# Patient Record
Sex: MALE | Race: BLACK OR AFRICAN AMERICAN | Employment: OTHER | URBAN - METROPOLITAN AREA
[De-identification: names, ages, dates, MRNs, and addresses within clinical notes are randomized per-mention and may not be internally consistent; named-entity substitution may affect disease eponyms.]

---

## 2017-07-12 ENCOUNTER — OFFICE VISIT (OUTPATIENT)
Dept: VASCULAR SURGERY | Age: 67
End: 2017-07-12

## 2017-07-12 VITALS
HEIGHT: 64 IN | HEART RATE: 70 BPM | WEIGHT: 178 LBS | SYSTOLIC BLOOD PRESSURE: 132 MMHG | RESPIRATION RATE: 18 BRPM | BODY MASS INDEX: 30.39 KG/M2 | DIASTOLIC BLOOD PRESSURE: 80 MMHG

## 2017-07-12 DIAGNOSIS — F17.210 CIGARETTE SMOKER: ICD-10-CM

## 2017-07-12 DIAGNOSIS — M79.89 LEG SWELLING: Primary | ICD-10-CM

## 2017-07-13 NOTE — PROGRESS NOTES
[address] [facility]    Chief Complaint   Patient presents with    Swelling       History and Physical    Mr. Lavena Fabry is a 77year old male who presents to our office for evaluation of bilateral leg swelling with new onset burning in his legs. Mr. Lavena Fabry states that he had no previous history of leg swelling, DVTs, congestive heart failure or lymphedema. He states that starting a few months ago he developed tense bilateral lower leg edema. It never caused him pain, but his legs were extremely swollen. Recently, his leg swelling has decreased significantly. This has made him happy but with decrease in edema his leg has become itchy and he gets occasional burning in the skin of his legs in the morning. He denies any pain with walking, any redness or any wounds or ulcers. He is a social smoker. Past Medical History:   Diagnosis Date    Arthritis     Chronic pain     general    Hypertension     Other ill-defined conditions     high cholestrol    Other ill-defined conditions     chronic redness in eyes    Psychiatric disorder     anxiety/depression    Seizures ([facility] [address]      Patient Active Problem List   Diagnosis Code    Screen for colon cancer Z12.11     Past Surgical History:   Procedure Laterality Date    HX GI      colonoscopy x2    HX ORTHOPAEDIC      left knee scope x2     Current Outpatient Prescriptions   Medication Sig Dispense Refill    Amlodipine-Olmesartan 10-20 mg Tab Take 1 Cap by mouth daily.  bisoprolol-hydrochlorothiazide (ZIAC) 5-6.25 mg per tablet Take 1 Tab by mouth daily.  sertraline (ZOLOFT) 50 mg tablet Take 50 mg by mouth daily.  cod liver-vit a&d-petrolatum 96 % Oint by Apply Externally route daily.  fluorometholone (FML) 0.1 % ophthalmic suspension Administer 1 Drop to both eyes every four (4) hours.  hydrocodone-acetaminophen 5-500 mg Cap Take 1 Cap by mouth as needed.       cholecalciferol, vitamin D3, (VITAMIN D3) 2,000 unit Tab Take 1 Cap by mouth daily.      traMADol (ULTRAM) 50 mg tablet Take 50 mg by mouth every six (6) hours as needed.  ezetimibe-simvastatin (VYTORIN 10/40) 10-40 mg per tablet Take 1 Tab by mouth nightly.  omega 3-dha-epa-fish oil (FISH OIL) 100-160-1,000 mg Cap Take 1 Cap by mouth. Allergies   Allergen Reactions    Pcn [Penicillins] Other (comments)     Dizziness, loss of vision     Social History     Social History    Marital status:      Spouse name: N/A    Number of children: N/A    Years of education: N/A     Occupational History    Not on file. Social History Main Topics    Smoking status: Current Some Day Smoker     Years: 30.00    Smokeless tobacco: Never Used      Comment: 2-3 cigs    Alcohol use 1.5 oz/week     1 Glasses of wine, 1 Cans of beer, 1 Shots of liquor per week      Comment: on weekends    Drug use: No    Sexual activity: Not on file     Other Topics Concern    Not on file     Social History Narrative      Family History   Problem Relation Age of Onset    Malignant Hyperthermia Neg Hx     Pseudocholinesterase Deficiency Neg Hx     Delayed Awakening Neg Hx     Emergence Delirium Neg Hx     Post-op Nausea/Vomiting Neg Hx     Post-op Cognitive Dysfunction Neg Hx     Other Neg Hx        Review of Systems    Review of Systems   Constitutional: Negative for chills, diaphoresis, fever, malaise/fatigue and weight loss. HENT: Negative for hearing loss and sore throat. Eyes: Negative for blurred vision, photophobia and redness. Respiratory: Negative for cough, hemoptysis, shortness of breath and wheezing. Cardiovascular: Positive for leg swelling. Negative for chest pain, palpitations and orthopnea. Gastrointestinal: Negative for abdominal pain, blood in stool, constipation, diarrhea, heartburn, nausea and vomiting. Genitourinary: Negative for dysuria, frequency, hematuria and urgency. Musculoskeletal: Negative for back pain and myalgias.    Skin: Negative for itching and rash. Neurological: Negative for dizziness, speech change, focal weakness, weakness and headaches. Endo/Heme/Allergies: Does not bruise/bleed easily. Psychiatric/Behavioral: Negative for depression and suicidal ideas. Physical Exam:    Visit Vitals    /80    Pulse 70    Resp 18    Ht 5' 4\" (1.626 m)    Wt 178 lb (80.7 kg)    BMI 30.55 kg/m2      Physical Examination: General appearance - alert, well appearing, and in no distress  Mental status - alert, oriented to person, place, and time  Eyes - sclera anicteric, left eye normal, right eye normal  Ears - right ear normal, left ear normal  Nose - normal and patent, no erythema, discharge or polyps  Mouth - mucous membranes moist, pharynx normal without lesions  Neck - supple, no significant adenopathy  Lymphatics - no palpable lymphadenopathy  Chest - clear to auscultation, no wheezes, rales or rhonchi, symmetric air entry  Heart - normal rate and regular rhythm  Abdomen - soft, nontender, nondistended, no masses or organomegaly  Extremities - Bilateral lower extremities with 1+ pitting edema. No wounds, no erythema, no prominent varicose veins      Impression and Plan:    ICD-10-CM ICD-9-CM    1. Leg swelling M79.89 729.81    2. Cigarette smoker F17.210 305.1 LOWER EXT ART PVR MULT LEVEL SEG PRESSURES     Orders Placed This Encounter    LOWER EXT ART PVR MULT LEVEL SEG PRESSURES     I told Mr. Kelsey that I am unable to see any of his leg ultrasounds that he had completed earlier. I do not believe his leg swelling is due to venous reflux however. He may have lymphedema, but an acute onset like this unprovoked would be unusual.  I also believe his leg burning in the morning is due to his previous intense edema resolving and that puts stress on the dermal layers. However, given his history of cigarette smoking and somewhat decreased pedal pulses, we will obtain ABIs to further investigate his arterial flow.   We have also given him some tubigrip for temporary compression. We will discuss compression stockings again at his next visit. Follow-up Disposition:  Return in about 4 weeks (around 8/9/2017). The treatment plan was reviewed with the patient in detail. The patient voiced understanding of this plan and all questions and concerns were addressed. The patient agrees with this plan. We discussed the signs and symptoms that would require earlier attention or intervention. The patient was given educational material related to his/her visit and the patient has voiced understanding of the material.     I appreciate the opportunity to participate in the care of your patient. I will be sure to keep you informed of any subsequent changes in the treatment plan. If you have any questions or concerns, please feel free to contact me. Carmen Chaudhry MD    PLEASE NOTE:  This document has been produced using voice recognition software. Unrecognized errors in transcription may be present.

## 2017-08-16 ENCOUNTER — HOSPITAL ENCOUNTER (OUTPATIENT)
Dept: VASCULAR SURGERY | Age: 67
Discharge: HOME | End: 2017-08-16
Attending: SURGERY
Payer: MEDICARE

## 2017-08-16 DIAGNOSIS — F17.210 CIGARETTE SMOKER: ICD-10-CM

## 2017-08-16 PROCEDURE — 93923 UPR/LXTR ART STDY 3+ LVLS: CPT

## 2017-08-16 NOTE — PROCEDURES
[facility]  *** FINAL REPORT ***    Name: Reid Melendrez  MRN: [id number]    Outpatient  : 05 Aug 1950  HIS Order #: [id number]  [address] [facility] Visit #: [id number]  Date: 16 Aug 2017    TYPE OF TEST: Peripheral Arterial Testing    REASON FOR TEST  Claudication    Right Leg  Segmentals: Normal                     mmHg  Brachial         137  High thigh  Low thigh  Calf  Posterior tibial 163  Dorsalis pedis   146  Peroneal  Metatarsal  Toe pressure     116  Doppler:    Normal  Ankle/Brachial: 1.19    Left Leg  Segmentals: Normal                     mmHg  Brachial         135  High thigh  Low thigh  Calf  Posterior tibial 157  Dorsalis pedis   159  Peroneal  Metatarsal  Toe pressure      94  Doppler:    Normal  Ankle/Brachial: 1.16    INTERPRETATION/FINDINGS  Physiologic testing was performed using continuous wave Doppler and  segmental pressures. 1. No evidence of significant peripheral arterial disease at rest in  the right leg. 2. No evidence of significant peripheral arterial disease at rest in  the left leg. 3. The right ankle/brachial index is 1.19 and the toe/brachial index  is 0.85  4. The left ankle/brachial index is 1.16 and the toe/brachial index is   0.69    ADDITIONAL COMMENTS    I have personally reviewed the data relevant to the interpretation of  this  study.     TECHNOLOGIST: Elaina Current  Signed: 2017 12:17 PM    PHYSICIAN: Diane You MD  Signed: 2017 04:02 PM

## 2017-08-23 ENCOUNTER — OFFICE VISIT (OUTPATIENT)
Dept: VASCULAR SURGERY | Age: 67
End: 2017-08-23

## 2017-08-23 VITALS
HEART RATE: 72 BPM | WEIGHT: 178 LBS | HEIGHT: 64 IN | DIASTOLIC BLOOD PRESSURE: 76 MMHG | SYSTOLIC BLOOD PRESSURE: 122 MMHG | BODY MASS INDEX: 30.39 KG/M2 | RESPIRATION RATE: 20 BRPM

## 2017-08-23 DIAGNOSIS — M79.89 LEG SWELLING: Primary | ICD-10-CM

## 2017-08-23 NOTE — MR AVS SNAPSHOT
Visit Information Date & Time Provider Department Dept. Phone Encounter #  
 8/23/2017  9:45 AM Silvia Celeste MD [facility] [address] [id number] Your Appointments 2/6/2018  9:15 AM  
Follow Up with Silvia Celeste MD  
[facility] Appt Note: 6 month FU no studies [facility] [address] [place] [address]  
  
   
 [facility] Shefali Ayala Upcoming Health Maintenance Date Due Hepatitis C Screening 1950 DTaP/Tdap/Td series (1 - Tdap) 8/5/1971 FOBT Q 1 YEAR AGE 50-75 8/5/2000 ZOSTER VACCINE AGE 60> 6/5/2010 GLAUCOMA SCREENING Q2Y 8/5/2015 Pneumococcal 65+ Low/Medium Risk (1 of 2 - PCV13) 8/5/2015 MEDICARE YEARLY EXAM 8/5/2015 INFLUENZA AGE 9 TO ADULT 8/1/2017 Allergies as of 8/23/2017  Review Complete On: 8/23/2017 By: Fara Thornton RN Severity Noted Reaction Type Reactions Pcn [Penicillins] High 10/08/2012    Other (comments) Dizziness, loss of vision Current Immunizations  Never Reviewed No immunizations on file. Not reviewed this visit Vitals BP Pulse Resp Height(growth percentile) Weight(growth percentile) BMI  
 122/76 72 20 5' 4\" (1.626 m) 178 lb (80.7 kg) 30.55 kg/m2 Smoking Status Current Some Day Smoker Vitals History BMI and BSA Data Body Mass Index Body Surface Area 30.55 kg/m 2 1.91 m 2 Your Updated Medication List  
  
   
This list is accurate as of: 8/23/17 10:10 AM.  Always use your most recent med list. amLODIPine-Olmesartan 10-20 mg Tab Take 1 Cap by mouth daily. bisoprolol-hydroCHLOROthiazide 5-6.25 mg per tablet Commonly known as:  [facility] Take 1 Tab by mouth daily. cod liver-vit a&d-petrolatum 96 % Oint  
by Apply Externally route daily. FISH -160-1,000 mg Cap Generic drug:  omega 3-dha-epa-fish oil Take 1 Cap by mouth. fluorometholone 0.1 % ophthalmic suspension Commonly known as: [address] [facility] Administer 1 Drop to both eyes every four (4) hours. HYDROcodone-acetaminophen 5-500 mg Cap Take 1 Cap by mouth as needed. sertraline 50 mg tablet Commonly known as:  ZOLOFT Take 50 mg by mouth daily. traMADol 50 mg tablet Commonly known as:  ULTRAM  
Take 50 mg by mouth every six (6) hours as needed. VITAMIN D3 2,000 unit Tab Generic drug:  cholecalciferol (vitamin D3) Take 1 Cap by mouth daily. Vytorin 10/40 10-40 mg per tablet Generic drug:  ezetimibe-simvastatin Take 1 Tab by mouth nightly. Introducing [facility]! Mikhail Mosquera introduces [employer]pt[employer] patient portal. Now you can access parts of your medical record, email your doctor's office, and request medication refills online. 1. In your internet browser, go to [email or web address] 2. Click on the First Time User? Click Here link in the Sign In box. You will see the New Member Sign Up page. 3. Enter your ComptTIA Access Code exactly as it appears below. You will not need to use this code after youve completed the sign-up process. If you do not sign up before the expiration date, you must request a new code. · Compt[employer] Access Code: [email or web address] Expires: 10/10/2017  9:44 AM 
 
4. Enter the last four digits of your Social Security Number (xxxx) and Date of Birth (mm/dd/yyyy) as indicated and click Submit. You will be taken to the next sign-up page. 5. Create a ComptTIA ID. This will be your ComptTIA login ID and cannot be changed, so think of one that is secure and easy to remember. 6. Create a ComptTIA password. You can change your password at any time. 7. Enter your Password Reset Question and Answer. This can be used at a later time if you forget your password. 8. Enter your e-mail address. You will receive e-mail notification when new information is available in [address] 9. Click Sign Up. You can now view and download portions of your medical record. 10. Click the Download Summary menu link to download a portable copy of your medical information. If you have questions, please visit the Frequently Asked Questions section of the DLS website. Remember, DLS is NOT to be used for urgent needs. For medical emergencies, dial 911. Now available from your iPhone and Android! Please provide this summary of care documentation to your next provider. Your primary care clinician is listed as Luzma Lange III. If you have any questions after today's visit, please call [phone].

## 2017-08-25 NOTE — PROGRESS NOTES
[address] [facility]    Chief Complaint   Patient presents with    Swelling       History and Physical    Mr. January Drake returns to our office for continued management of his bilateral leg swelling. He continues to state that his legs have returned to normal size. He states that the burning he complained of at his last appointment has resolved. He has no additional complaints. Past Medical History:   Diagnosis Date    Arthritis     Chronic pain     general    Hypertension     Other ill-defined conditions     high cholestrol    Other ill-defined conditions     chronic redness in eyes    Psychiatric disorder     anxiety/depression    Seizures ([facility] [address]      Patient Active Problem List   Diagnosis Code    Screen for colon cancer Z12.11     Past Surgical History:   Procedure Laterality Date    HX GI      colonoscopy x2    HX ORTHOPAEDIC      left knee scope x2     Current Outpatient Prescriptions   Medication Sig Dispense Refill    Amlodipine-Olmesartan 10-20 mg Tab Take 1 Cap by mouth daily.  bisoprolol-hydrochlorothiazide (ZIAC) 5-6.25 mg per tablet Take 1 Tab by mouth daily.  sertraline (ZOLOFT) 50 mg tablet Take 50 mg by mouth daily.  cod liver-vit a&d-petrolatum 96 % Oint by Apply Externally route daily.  omega 3-dha-epa-fish oil (FISH OIL) 100-160-1,000 mg Cap Take 1 Cap by mouth.  fluorometholone (FML) 0.1 % ophthalmic suspension Administer 1 Drop to both eyes every four (4) hours.  hydrocodone-acetaminophen 5-500 mg Cap Take 1 Cap by mouth as needed.  cholecalciferol, vitamin D3, (VITAMIN D3) 2,000 unit Tab Take 1 Cap by mouth daily.  traMADol (ULTRAM) 50 mg tablet Take 50 mg by mouth every six (6) hours as needed.  ezetimibe-simvastatin (VYTORIN 10/40) 10-40 mg per tablet Take 1 Tab by mouth nightly.          Allergies   Allergen Reactions    Pcn [Penicillins] Other (comments)     Dizziness, loss of vision     Social History     Social History    Marital status:      Spouse name: N/A    Number of children: N/A    Years of education: N/A     Occupational History    Not on file. Social History Main Topics    Smoking status: Current Some Day Smoker     Years: 30.00    Smokeless tobacco: Never Used      Comment: 2-3 cigs    Alcohol use 1.5 oz/week     1 Glasses of wine, 1 Cans of beer, 1 Shots of liquor per week      Comment: on weekends    Drug use: No    Sexual activity: Not on file     Other Topics Concern    Not on file     Social History Narrative      Family History   Problem Relation Age of Onset    Malignant Hyperthermia Neg Hx     Pseudocholinesterase Deficiency Neg Hx     Delayed Awakening Neg Hx     Emergence Delirium Neg Hx     Post-op Nausea/Vomiting Neg Hx     Post-op Cognitive Dysfunction Neg Hx     Other Neg Hx        Review of Systems    Review of Systems   Constitutional: Negative for chills, diaphoresis, fever, malaise/fatigue and weight loss. HENT: Negative for hearing loss and sore throat. Eyes: Negative for blurred vision, photophobia and redness. Respiratory: Negative for cough, hemoptysis, shortness of breath and wheezing. Cardiovascular: Negative for chest pain, palpitations and orthopnea. Gastrointestinal: Negative for abdominal pain, blood in stool, constipation, diarrhea, heartburn, nausea and vomiting. Genitourinary: Negative for dysuria, frequency, hematuria and urgency. Musculoskeletal: Negative for back pain and myalgias. Skin: Negative for itching and rash. Neurological: Negative for dizziness, speech change, focal weakness, weakness and headaches. Endo/Heme/Allergies: Does not bruise/bleed easily. Psychiatric/Behavioral: Negative for depression and suicidal ideas.             Physical Exam:    Visit Vitals    /76    Pulse 72    Resp 20    Ht 5' 4\" (1.626 m)    Wt 178 lb (80.7 kg)    BMI 30.55 kg/m2      Physical Examination: General appearance - alert, well appearing, and in no distress  Mental status - alert, oriented to person, place, and time  Eyes - sclera anicteric, left eye normal, right eye normal  Ears - right ear normal, left ear normal  Nose - normal and patent, no erythema, discharge or polyps  Mouth - mucous membranes moist, pharynx normal without lesions  Extremities - 1+ pitting edema on the left. Trace edema on the right. No wounds or ulcerations. Impression and Plan:    ICD-10-CM ICD-9-CM    1. Leg swelling M79.89 729.81      I reviewed Mr. Kelsey's MIKA studies with him. This demonstrated normal arterial flow. I have again recommended compression stockings for his legs, but as his swelling has resolved, I do not believe he will wear them. We will check him again in 3 months to see how his legs are doing and to determine if continued follow up is needed. The treatment plan was reviewed with the patient in detail. The patient voiced understanding of this plan and all questions and concerns were addressed. The patient agrees with this plan. We discussed the signs and symptoms that would require earlier attention or intervention. The patient was given educational material related to his/her visit and the patient has voiced understanding of the material.     I appreciate the opportunity to participate in the care of your patient. I will be sure to keep you informed of any subsequent changes in the treatment plan. If you have any questions or concerns, please feel free to contact me. Norris Lopes MD    PLEASE NOTE:  This document has been produced using voice recognition software. Unrecognized errors in transcription may be present.

## 2018-10-31 ENCOUNTER — HOSPITAL ENCOUNTER (OUTPATIENT)
Dept: LAB | Age: 68
Discharge: HOME | End: 2018-10-31
Payer: MEDICARE

## 2018-10-31 ENCOUNTER — HOSPITAL ENCOUNTER (OUTPATIENT)
Dept: NON INVASIVE DIAGNOSTICS | Age: 68
Discharge: HOME | End: 2018-10-31
Payer: MEDICARE

## 2018-10-31 DIAGNOSIS — M75.101 ROTATOR CUFF SYNDROME OF RIGHT SHOULDER: ICD-10-CM

## 2018-10-31 DIAGNOSIS — M25.511 RIGHT SHOULDER PAIN: ICD-10-CM

## 2018-10-31 LAB
HCT VFR BLD AUTO: 44.8 % (ref 36–48)
HGB BLD-MCNC: 14.5 G/DL (ref 13–16)
POTASSIUM SERPL-SCNC: 4.5 MMOL/L (ref 3.5–5.5)

## 2018-10-31 PROCEDURE — 85014 HEMATOCRIT: CPT | Performed by: ORTHOPAEDIC SURGERY

## 2018-10-31 PROCEDURE — 36415 COLL VENOUS BLD VENIPUNCTURE: CPT | Performed by: ORTHOPAEDIC SURGERY

## 2018-10-31 PROCEDURE — 84132 ASSAY OF SERUM POTASSIUM: CPT | Performed by: ORTHOPAEDIC SURGERY

## 2018-10-31 PROCEDURE — 85018 HEMOGLOBIN: CPT | Performed by: ORTHOPAEDIC SURGERY

## 2018-10-31 PROCEDURE — 93005 ELECTROCARDIOGRAM TRACING: CPT

## 2018-11-01 LAB
ATRIAL RATE: 56 BPM
CALCULATED P AXIS, ECG09: 70 DEGREES
CALCULATED R AXIS, ECG10: 49 DEGREES
CALCULATED T AXIS, ECG11: 15 DEGREES
DIAGNOSIS, 93000: NORMAL
FAX TO INFO,FAXT: NORMAL
FAX TO NUMBER,FAXN: NORMAL
P-R INTERVAL, ECG05: 204 MS
Q-T INTERVAL, ECG07: 422 MS
QRS DURATION, ECG06: 84 MS
QTC CALCULATION (BEZET), ECG08: 407 MS
VENTRICULAR RATE, ECG03: 56 BPM

## 2019-04-16 ENCOUNTER — HOSPITAL ENCOUNTER (OUTPATIENT)
Dept: PHYSICAL THERAPY | Age: 69
Discharge: HOME | End: 2019-04-16
Payer: MEDICARE

## 2019-04-16 PROCEDURE — 97110 THERAPEUTIC EXERCISES: CPT

## 2019-04-16 PROCEDURE — 97530 THERAPEUTIC ACTIVITIES: CPT

## 2019-04-16 PROCEDURE — 97162 PT EVAL MOD COMPLEX 30 MIN: CPT

## 2019-04-16 NOTE — PROGRESS NOTES
[facility] at the [address], [place], [address] [facility]  Phone: [phone]      Fax:  [phone]       Plan of Care/ Statement of Necessity for Physical Therapy Services      Patient name: Bro Peraza Start of Care: 2019   Referral source: Kain Torres MD : 1950    Medical Diagnosis: Hip pain, right [M25.551]   Onset Date:~3 weeks ago    Treatment Diagnosis: Right Hip pain    Prior Hospitalization: see medical history Provider#: [id number]   Medications: Verified on Patient summary List    Comorbidities: HTN, BMI >30, previous surgeries   Prior Level of Function:  pain free with all daily, recreational activities       The Plan of Care and following information is based on the information from the initial evaluation. Assessment/ key information:   Pt is a 76 y.o. Male with C/C of right anterior hip pain with pain into right groin. Pt reports onset ~3 weeks ago with bending forward in the yard. Pt reports that pain is intermittent and primarily exacerbated with flexion and reaching to the right. Signs/Symptoms consistent with hip OA. Objective findings include increased right lateralization, decreased ROM, decreased strength, glut and hamstring inhibition and gait/balance deficits. Evaluation Complexity History HIGH Complexity :3+ comorbidities / personal factors will impact the outcome/ POC ; Examination HIGH Complexity : 4+ Standardized tests and measures addressing body structure, function, activity limitation and / or participation in recreation  ;Presentation MEDIUM Complexity : Evolving with changing characteristics  ; Clinical Decision Making MEDIUM Complexity : FOTO score of 26-74  Overall Complexity Rating: MEDIUM  Problem List: pain affecting function, decrease ROM, decrease strength, impaired gait/ balance, decrease ADL/ functional abilitiies, decrease activity tolerance and decrease flexibility/ joint mobility   Treatment Plan may include any combination of the following: Therapeutic exercise, Therapeutic activities, Neuromuscular re-education, Physical agent/modality, Gait/balance training, Manual therapy and Patient education  Patient / Family readiness to learn indicated by: asking questions, trying to perform skills and interest  Persons(s) to be included in education: patient (P)  Barriers to Learning/Limitations: None  Patient Goal (s): Make sure I do what ever is needed to prevent the pain from coming back. I want to get well.   Patient Self Reported Health Status: good  Rehabilitation Potential: good    Short Term Goals: STG- To be accomplished in 4  treatment(s):  1. Pt will be independent with HEP to encourage prophylaxis. Eval:dispensed - to be updated  Current: same as eval     Long Term Goals: LTG- To be accomplished in 12 visit(s):  1. Pt will improve standing forward flexion to dorsum of foot without pain to increase tolerance to daily activities and donning/doffing shoes. Eval:9 in from floor, no reversal of lordosis, decreased use of gluts to stand, reaches to the right   Current: same as eval     2. Pt will be able to perform >10 bridges to full height to indicate functional glut and hamstring strength. Eval:1 bridge, decreased height   Current: same as eval     3. Pt will imporve bilateral HIP [facility] and trunk rot to 15 in or less to allow pt to return to PLOF. Eval:  AROM/PROM Right Left   Hip Flex 96 100   Hip IR 16 19   Hip ER 24 20   Trunk Rot 17 in 17 in                      Current: same as eval     4. Pt FOTO score will increase by >12 points to show improvement in function. Eval:40  Current: will address at visit 5      Frequency / Duration: Patient to be seen 2 times per week for 6 weeks. /12 visits    Patient/ Caregiver education and instruction: Diagnosis, prognosis, self care, activity modification and exercises   [x]  Plan of care has been reviewed with PTA    Certification Period: 4/16/19 - 6/15/19  Sergio Monae, PT, DPT 4/16/2019 2:47 PM  _____________________________________________________________________  I certify that the above Therapy Services are being furnished while the patient is under my care. I agree with the treatment plan and certify that this therapy is necessary.     Physician's Signature:____________Date:_________TIME:________    [employer], Date and Time must be completed for valid certification **    Please sign and return to [facility] at the [address], [facility], [address] [facility]       Phone: [phone]      Fax:  [phone]

## 2019-04-16 NOTE — PROGRESS NOTES
PT DAILY TREATMENT NOTE    Patient Name: Cee Foote  Date:2019  : 1950  [x]  Patient  Verified  Payor: Krystle Mustafa / Plan: [place] / Product Type: Medicare /    In time:1:18 pm  Out time:2:02 pm   Total Treatment Time (min): 44  Total Timed Codes (min): 23  1:1 Treatment Time ( [address] only): 42   Visit #: 1 of 12    Treatment Area: Hip pain, right [M25.551]    SUBJECTIVE  Pain Level (0-10 scale): 0  Any medication changes, allergies to medications, adverse drug reactions, diagnosis change, or new procedure performed?: [x] No    [] Yes (see summary sheet for update)  Subjective functional status/changes:   [] No changes reported    Hx Present Illness: C/C of right hip pain  \"I was bending over and I got a sharp pain in what I thought was my groin. I would call the pain a cramp. \"  Onset ~3 weeks ago  Saw MD ~2 weeks ago  X-ray by MD - per pt showed arthritis  Reports bending down in backyard - reports pain has been intermittent since initial episode  Increase in pain with bending, especially bending to the right, bending over, putting on shoes(right)  Denies pain with walking, stairs  PLOF: pain free with all daily, recreational activities   Pt reports in last6-12 months frequent cramping in LE especially left gastroc      Pain:  _10__/10 max       __0_/10 min     _0___/10 now    Location: indicates anterior right hip and groin     [x] Sharp    [] Dull      [] Burning     []  Aching     [] Throbbing      [] Tingling     [x] Other: \"sharp and hurting\"      []  Constant                   [x] Intermittent        Previous treatment:   None as per pt     PMHX: PMHx/Surgical Hx:  Right RCR - 2018, Left knee surgery many years ago     Social/Recreation/Work: Work Hx: [employer], [place] [employer]   Living Situation: lives with wife, no stairs inside home, 3 steps to enter home   Recreational Activities: riding motorcycle, yard work     Patient Goal(s): \"Make sure I do what ever is needed to prevent the pain from coming back. I want to get well. \"    Cognition: A & O x 4      OBJECTIVE    Modalities Rationale:       min [] Estim, type/location:                                      []  att     []  unatt     []  w/US     []  w/ice    []  w/heat    min []  Mechanical Traction: type/lbs                   []  pro   []  sup   []  int   []  cont    []  before manual    []  after manual    min []  Ultrasound, settings/location:      min []  Iontophoresis w/ dexamethasone, location:                                               []  take home patch       []  in clinic    min []  Ice     []  Heat    location/position:     min []  Vasopneumatic Device, press/temp:     min []  Other:    [] Skin assessment post-treatment (if applicable):    []  intact    []  redness- no adverse reaction     []redness - adverse reaction:        22 min [x]Eval                  []Re-Eval       10 min Therapeutic Exercise:  [x] See flow sheet :   Rationale: increase ROM, increase strength, improve coordination and increase proprioception to improve the patients ability to perform daily activities with decreased pain and symptom levels          With   [] TE   [x] TA - 13 min   [] neuro   [] other: Patient Education: [x] Review HEP    [] Progressed/Changed HEP based on:   [] positioning   [] body mechanics   [] transfers   [] heat/ice application    [x] other: pt education regarding exam findings, anatomy involved, POC     Other Objective/Functional Measures:    Movement/gait:  Decreased arm swing bilaterally    Visual Inspection: Thoracic: flattened  Lumbar: increased  Shoulder/Scapula: adducted-WNL    Palpation: no TTP but increased tone in bilateral hip flexors posterior hip and along paraspinals                            AROM/PROM Right Left   Hip Flex 96 100   Hip IR 16 19   Hip ER 24 20                    Strength Right Left   Hip Flex 4 4   Knee Ext 5 5   Hamstrings 4 4**                  Noted significant compensations with trunk during MMT  Bridging: no pain, decreased height   Glut inhibition: bilaterally   cramping in left gastroc with MMT of hamstrings     Special Tests Right Left   Hip Scour + -   Trunk Rot (hooklying) 17 in 17 in    Passive SLR 90 68   Hip add drop + +,unable to get to past                Other Right Left                                       Other Comments: Standing Forward Flexion 9 in from floor, no reversal of lordosis, decreased use of gluts to stand, reaches to the right   Gillet: hypomobile and increased lateralization bilaterally       Pain Level (0-10 scale) post treatment: 0    ASSESSMENT/Changes in Function:   Pt is a 76 y.o. Male with C/C of right anterior hip pain with pain into right groin. Pt reports onset ~3 weeks ago with bending forward in the yard. Pt reports that pain is intermittent and primarily exacerbated with flexion and reaching to the right. Signs/Symptoms consistent with hip OA. Objective findings include increased right lateralization, decreased ROM, decreased strength, glut and hamstring inhibition and gait/balance deficits. Patient will continue to benefit from skilled PT services to modify and progress therapeutic interventions, address functional mobility deficits, address ROM deficits, address strength deficits, analyze and address soft tissue restrictions, analyze and cue movement patterns, analyze and modify body mechanics/ergonomics, assess and modify postural abnormalities and instruct in home and community integration to attain remaining goals. [x]  See Plan of Care  []  See progress note/recertification  []  See Discharge Summary         Progress towards goals / Updated goals:  Short Term Goals: STG- To be accomplished in 4  treatment(s):  1. Pt will be independent with HEP to encourage prophylaxis. Eval:dispensed - to be updated  Current: same as eval    Long Term Goals: LTG- To be accomplished in 12 visit(s):  1.   Pt will improve standing forward flexion to dorsum of foot without pain to increase tolerance to daily activities and donning/doffing shoes. Eval:9 in from floor, no reversal of lordosis, decreased use of gluts to stand, reaches to the right   Current: same as eval    2. Pt will be able to perform >10 bridges to full height to indicate functional glut and hamstring strength. Eval:1 bridge, decreased height   Current: same as eval    3. Pt will imporve bilateral HIP AROM to [facility] and trunk rot to 15 in or less to allow pt to return to PLOF. Eval:  AROM/PROM Right Left   Hip Flex 96 100   Hip IR 16 19   Hip ER 24 20   Trunk Rot 17 in 17 in               Current: same as eval    4. Pt FOTO score will increase by >12 points to show improvement in function. Eval:40  Current: will address at visit 5      PLAN  [x]  Upgrade activities as tolerated     []  Continue plan of care  []  Update interventions per flow sheet       []  Discharge due to:_  []  Other:_      Margy Leonard, PT, DPT 4/16/2019  1:18 PM    No future appointments.

## 2019-04-19 ENCOUNTER — HOSPITAL ENCOUNTER (OUTPATIENT)
Dept: PHYSICAL THERAPY | Age: 69
Discharge: HOME | End: 2019-04-19
Payer: MEDICARE

## 2019-04-19 PROCEDURE — 97110 THERAPEUTIC EXERCISES: CPT

## 2019-04-19 NOTE — PROGRESS NOTES
PT DAILY TREATMENT NOTE    Patient Name: Angela Cho  Date:2019  : 1950  [x]  Patient  Verified  Payor: Crystal Janell / Plan: [place] MEDICARE PART A & B / Product Type: Medicare /    In time:7:45  Out time:8:25  Total Treatment Time (min): 40  Total Timed Codes (min): 40  1:1 Treatment Time ( [address] only): 40   Visit #: 2 of 12    Treatment Area: Hip pain, right [M25.551]    SUBJECTIVE  Pain Level (0-10 scale): 0  Any medication changes, allergies to medications, adverse drug reactions, diagnosis change, or new procedure performed?: [x] No    [] Yes (see summary sheet for update)  Subjective functional status/changes:   [] No changes reported  \"good , just stiff. \"     OBJECTIVE    Modalities Rationale:     decrease pain to improve patient's ability to perform pain free ADL's    min [] Estim, type/location:                                      []  att     []  unatt     []  w/US     []  w/ice    []  w/heat    min []  Mechanical Traction: type/lbs                   []  pro   []  sup   []  int   []  cont    []  before manual    []  after manual    min []  Ultrasound, settings/location:      min []  Iontophoresis w/ dexamethasone, location:                                               []  take home patch       []  in clinic    min []  Ice     []  Heat    location/position:     min []  Vasopneumatic Device, press/temp:     min []  Other:    [] Skin assessment post-treatment (if applicable):    []  intact    []  redness- no adverse reaction     []redness - adverse reaction:          40 min Therapeutic Exercise:  [] See flow sheet :   Rationale: increase ROM, increase strength, improve coordination and improve balance to improve the patients ability to perform pain free ADl's     With   [] TE   [] TA   [] neuro   [] other: Patient Education: [x] Review HEP    [] Progressed/Changed HEP based on:   [] positioning   [] body mechanics   [] transfers   [] heat/ice application    [] other:      Other Objective/Functional Measures:   Challenged with PPT      Pain Level (0-10 scale) post treatment: 0    ASSESSMENT/Changes in Function: Pt reported soreness , however no significant pain. challenged with understanding and performing PPT. C/o left gastroc cramp with Pelvic tilting. Patient will continue to benefit from skilled PT services to modify and progress therapeutic interventions, address functional mobility deficits, address ROM deficits, address strength deficits, analyze and address soft tissue restrictions, analyze and cue movement patterns, analyze and modify body mechanics/ergonomics, assess and modify postural abnormalities, address imbalance/dizziness and instruct in home and community integration to attain remaining goals. []  See Plan of Care  []  See progress note/recertification  []  See Discharge Summary         Progress towards goals / Updated goals:  Short Term Goals: STG- To be accomplished in 4  treatment(s):  1. Pt will be independent with HEP to encourage prophylaxis. Eval:dispensed - to be updated  Current: reveiwed      Long Term Goals: LTG- To be accomplished in 12 visit(s):  1. Pt will improve standing forward flexion to dorsum of foot without pain to increase tolerance to daily activities and donning/doffing shoes. Eval:9 in from floor, no reversal of lordosis, decreased use of gluts to stand, reaches to the right   Current: same as eval     2. Pt will be able to perform >10 bridges to full height to indicate functional glut and hamstring strength. Eval:1 bridge, decreased height   Current: same as eval     3. Pt [facility] and [facility] rot to 15 in or less to allow pt to return to PLOF. Eval:  AROM/PROM Right Left   Hip Flex 96 100   Hip IR 16 19   Hip ER 24 20   Trunk Rot 17 in 17 in                      Current: same as eval     4. Pt FOTO score will increase by >12 points to show improvement in function.   Eval:40  Current: will address at visit 5                PLAN  [x]  Upgrade activities as tolerated     [x]  Continue plan of care  []  Update interventions per flow sheet       []  Discharge due to:_  []  Other:_      Kurt Connor PTA 4/19/2019  7:41 AM    Future Appointments   Date Time Provider Estevan Raphael   4/19/2019  7:45 AM RICKY Aguirre [facility]   4/22/2019  8:45 AM Manas Sierra PT, DPT [facility]   4/26/2019  1:00 PM Manas Sierra PT, DPT [facility]   4/30/2019  8:30 AM Tawanna Kimble [facility]   5/2/2019 12:30 PM RICKY [facility]ir[facility]

## 2019-04-22 ENCOUNTER — HOSPITAL ENCOUNTER (OUTPATIENT)
Dept: PHYSICAL THERAPY | Age: 69
Discharge: HOME | End: 2019-04-22
Payer: MEDICARE

## 2019-04-22 PROCEDURE — 97110 THERAPEUTIC EXERCISES: CPT

## 2019-04-22 NOTE — PROGRESS NOTES
PT DAILY TREATMENT NOTE    Patient Name: Adolfo Canseco  Date:2019  : 1950  [x]  Patient  Verified  Payor: Michael Wilson / Plan: [place] MEDICARE PART A & B / Product Type: Medicare /    In time:8:43 am  Out time:9:39 am  Total Treatment Time (min): 56  Total Timed Codes (min): 56  1:1 Treatment Time ( [address] only): 39   Visit #: 3 of 12    Treatment Area: Hip pain, right [M25.551]    SUBJECTIVE  Pain Level (0-10 scale): 0  Any medication changes, allergies to medications, adverse drug reactions, diagnosis change, or new procedure performed?: [x] No    [] Yes (see summary sheet for update)  Subjective functional status/changes:   [] No changes reported  \"Good, no pain since I started here. \"    OBJECTIVE    Modalities Rationale:     min [] Estim, type/location:                                      []  att     []  unatt     []  w/US     []  w/ice    []  w/heat    min []  Mechanical Traction: type/lbs                   []  pro   []  sup   []  int   []  cont    []  before manual    []  after manual    min []  Ultrasound, settings/location:      min []  Iontophoresis w/ dexamethasone, location:                                               []  take home patch       []  in clinic    min []  Ice     []  Heat    location/position:     min []  Vasopneumatic Device, press/temp:     min []  Other:    [] Skin assessment post-treatment (if applicable):    []  intact    []  redness- no adverse reaction     []redness - adverse reaction:          56 min Therapeutic Exercise:  [x] See flow sheet :   Rationale: increase ROM, increase strength, improve coordination and increase proprioception to improve the patients ability to perform daily activities with decreased pain and symptom levels            With   [] TE   [] TA   [] neuro   [] other: Patient Education: [x] Review HEP    [] Progressed/Changed HEP based on:   [] positioning   [] body mechanics   [] transfers   [] heat/ice application    [] other:      Other Objective/Functional Measures:   Standing Forward flexion: 8 in, no lumbar lordosis mild pinching. Able to decrease pinching with PPT before forward flexion      Pain Level (0-10 scale) post treatment: 0    ASSESSMENT/Changes in Function:   Pt education regarding anatomy and biomechanics - discussed with pt has pinching not just because of Hip OA but alos because of anterior pelvic tilt and poor lumbo-pelvic mechanics. Challenged with clamshells in S/L and with cat camel. Patient will continue to benefit from skilled PT services to modify and progress therapeutic interventions, address functional mobility deficits, address ROM deficits, address strength deficits, analyze and address soft tissue restrictions, analyze and cue movement patterns, analyze and modify body mechanics/ergonomics, assess and modify postural abnormalities and instruct in home and community integration to attain remaining goals. []  See Plan of Care  []  See progress note/recertification  []  See Discharge Summary         Progress towards goals / Updated goals:  Short Term Goals: STG- To be accomplished in 4  treatment(s):  1.  Pt will be independent with HEP to encourage prophylaxis. Eval:dispensed - to be updated  Current: progressing/met - reveiwed, reports compliance      Long Term Goals: LTG- To be accomplished in 12 visit(s):  1.  Pt will improve standing forward flexion to dorsum of foot without pain to increase tolerance to daily activities and donning/doffing shoes. Eval:9 in from floor, no reversal of lordosis, decreased use of gluts to stand, reaches to the right   Current: mild progress: 8 in, no lumbar lordosis mild pinching. Able to decrease pinching with PPT before forward flexion      2.  Pt will be able to perform >10 bridges to full height to indicate functional glut and hamstring strength.   Eval:1 bridge, decreased height   Current: continued decreased height     3.  Pt will imporve bilateral HIP AROM to [facility] and trunk rot to 15 in or less to allow pt to return to PLOF. Eval:  AROM/PROM Right Left   Hip Flex 96 100   Hip IR 16 19   Hip ER 24 20   Trunk Rot 17 in 17 in                      Current: same as eval     4.  Pt FOTO score will increase by >12 points to show improvement in function.   Eval:40  Current: will address at visit 5    PLAN  [x]  Upgrade activities as tolerated     []  Continue plan of care  []  Update interventions per flow sheet       []  Discharge due to:_  []  Other:_      Teddy Schneider, PT, DPT 4/22/2019  8:53 AM    Future Appointments   Date Time Provider Estevan Raphael   4/26/2019  1:00 PM Daxa Pisano, PT, DPT [facility]   4/30/2019  8:30 AM Kaden Kimble [facility]   5/2/2019 12:30 PM Robert Gaines PTA [facility]

## 2019-04-30 ENCOUNTER — HOSPITAL ENCOUNTER (OUTPATIENT)
Dept: PHYSICAL THERAPY | Age: 69
End: 2019-04-30
Payer: MEDICARE

## 2019-05-02 ENCOUNTER — APPOINTMENT (OUTPATIENT)
Dept: PHYSICAL THERAPY | Age: 69
End: 2019-05-02
Payer: MEDICARE

## 2019-05-09 ENCOUNTER — HOSPITAL ENCOUNTER (OUTPATIENT)
Dept: PHYSICAL THERAPY | Age: 69
Discharge: HOME | End: 2019-05-09
Payer: MEDICARE

## 2019-05-09 PROCEDURE — 97110 THERAPEUTIC EXERCISES: CPT

## 2019-05-14 ENCOUNTER — HOSPITAL ENCOUNTER (OUTPATIENT)
Dept: PHYSICAL THERAPY | Age: 69
Discharge: HOME | End: 2019-05-14
Payer: MEDICARE

## 2019-05-14 PROCEDURE — 97110 THERAPEUTIC EXERCISES: CPT

## 2019-05-14 NOTE — PROGRESS NOTES
PT DAILY TREATMENT NOTE    Patient Name: Estefany Rasmussen  Date:2019  : 1950  [x]  Patient  Verified  Payor: Meena Corea / Plan: [place] MEDICARE PART A & B / Product Type: Medicare /    In time:9:14  Out time:10:20  Total Treatment Time (min): 66  Total Timed Codes (min): 66  1:1 Treatment Time ( [address] only): 55   Visit #: 5 of 12    Treatment Area: Hip pain, right [M25.551]    SUBJECTIVE  Pain Level (0-10 scale): 0  Any medication changes, allergies to medications, adverse drug reactions, diagnosis change, or new procedure performed?: [x] No    [] Yes (see summary sheet for update)  Subjective functional status/changes:   [] No changes reported  \"Just tender, not sore. \"    OBJECTIVE      66 min Therapeutic Exercise:  [x] See flow sheet :   Rationale: increase ROM and increase strength to improve the patients ability to perform daily activities with decreased pain and symptom levels       With   [] TE   [] TA   [] neuro   [] other: Patient Education: [x] Review HEP    [] Progressed/Changed HEP based on:   [] positioning   [] body mechanics   [] transfers   [] heat/ice application    [] other:      Other Objective/Functional Measures:   FOTO 65     Pain Level (0-10 scale) post treatment: 0    ASSESSMENT/Changes in Function: Good tolerance to exercises with challenge with clams and bridges today due to fatigue. Reporting overall pain decreasing in hip just soreness now. Decreased balance with elton step overs with cues to decrease hip abduction. Patient will continue to benefit from skilled PT services to modify and progress therapeutic interventions, address functional mobility deficits, address ROM deficits, address strength deficits, analyze and cue movement patterns, analyze and modify body mechanics/ergonomics, assess and modify postural abnormalities, address imbalance/dizziness and instruct in home and community integration to attain remaining goals.      []  See Plan of Care  []  See progress note/recertification  []  See Discharge Summary         Progress towards goals / Updated goals:  Short Term Goals: STG- To be accomplished in 4  treatment(s):  1.  Pt will be independent with HEP to encourage prophylaxis. Eval:dispensed - to be updated  Current: pt reports compliance-  goal MET     Long Term Goals: LTG- To be accomplished in 12 visit(s):  1.  Pt will improve standing forward flexion to dorsum of foot without pain to increase tolerance to daily activities and donning/doffing shoes. Eval:9 in from floor, no reversal of lordosis, decreased use of gluts to stand, reaches to the right   Current: mild progress: 8 in, no lumbar lordosis mild pinching. Able to decrease pinching with PPT before forward flexion      2.  Pt will be able to perform >10 bridges to full height to indicate functional glut and hamstring strength. Eval:1 bridge, decreased height   Current: improved height with adduction - progressing      3.  Pt will imporve bilateral HIP AROM to [facility] and trunk rot to 15 in or less to allow pt to return to PLOF. Eval:  AROM/PROM Right Left   Hip Flex 96 100   Hip IR 16 19   Hip ER 24 20   Trunk Rot 17 in 17 in                      Current: same as eval     4.  Pt FOTO score will increase by >12 points to show improvement in function.   Eval:40  Current: 72         PLAN  [x]  Upgrade activities as tolerated     [x]  Continue plan of care  []  Update interventions per flow sheet       []  Discharge due to:_  []  Other:_      Henry Milks 5/14/2019  9:19 AM    Future Appointments   Date Time Provider Estevan Raphael   5/16/2019  8:00 AM Skyla Kimble [facility]   5/21/2019  8:30 AM Skyla Kimble [facility]   5/23/2019  8:00 AM NATALIE Hong [facility]   5/30/2019 10:15 AM NATALIE Madden [facility]

## 2019-05-16 ENCOUNTER — HOSPITAL ENCOUNTER (OUTPATIENT)
Dept: PHYSICAL THERAPY | Age: 69
Discharge: HOME | End: 2019-05-16
Payer: MEDICARE

## 2019-05-16 PROCEDURE — 97110 THERAPEUTIC EXERCISES: CPT

## 2019-05-16 NOTE — PROGRESS NOTES
PT DAILY TREATMENT NOTE    Patient Name: Hortencia Taylor  Date:2019  : 1950  [x]  Patient  Verified  Payor: Kendra Connelly / Plan: [place] MED[place]ARE [place] & B / Product Type: Medicare /    In time:8:00  Out time:8:58  Total Treatment Time (min): 58  Total Timed Codes (min): 58  1:1 Treatment Time ( [address] only): 30   Visit #: 6 of 12    Treatment Area: Hip pain, right [M25.551]    SUBJECTIVE  Pain Level (0-10 scale): 0  Any medication changes, allergies to medications, adverse drug reactions, diagnosis change, or new procedure performed?: [x] No    [] Yes (see summary sheet for update)  Subjective functional status/changes:   [] No changes reported  \"Sore in the hip and shoulder     OBJECTIVE      58 min Therapeutic Exercise:  [x] See flow sheet :   Rationale: increase ROM and increase strength to improve the patients ability to perform daily activities with decreased pain and symptom levels           With   [] TE   [] TA   [] neuro   [] other: Patient Education: [x] Review HEP    [] Progressed/Changed HEP based on:   [] positioning   [] body mechanics   [] transfers   [] heat/ice application    [] other:      Other Objective/Functional Measures:   See updated goals below     Pain Level (0-10 scale) post treatment: 0    ASSESSMENT/Changes in Function: Pt presented to therapy with C/C of right anterior hip pain with pain into right groin starting a few weeks ago after repeated bending forward. Pt has attended 6 sessions with focusing on improving hip ROM, strength and overall mobility with pt reporting overall 80% improvement. Pt reporting no longer having pain only soreness in anterior hip after cutting grass and reaching down on the right side. Overall ROM is improving with most limited in trunk rotation and hip ER still. Strength is improving with increased height with bridges however very fatigue with clams. Pt would benefit from continued skilled PT services to address remaining unmet goals.        Patient will continue to benefit from skilled PT services to modify and progress therapeutic interventions, address functional mobility deficits, address ROM deficits, address strength deficits, analyze and cue movement patterns, analyze and modify body mechanics/ergonomics, assess and modify postural abnormalities and instruct in home and community integration to attain remaining goals. []  See Plan of Care  []  See progress note/recertification  []  See Discharge Summary         Progress towards goals / Updated goals:  Short Term Goals: STG- To be accomplished in 4  treatment(s):  1.  Pt will be independent with HEP to encourage prophylaxis. Eval:dispensed - to be updated  Current: pt reports compliance-  goal MET     Long Term Goals: LTG- To be accomplished in 12 visit(s):  1.  Pt will improve standing forward flexion to dorsum of foot without pain to increase tolerance to daily activities and donning/doffing shoes. Eval:9 in from floor, no reversal of lordosis, decreased use of gluts to stand, reaches to the right   Current: 8in - progressing      2.  Pt will be able to perform >10 bridges to full height to indicate functional glut and hamstring strength. Eval:1 bridge, decreased height   Current: improved height with adduction - progressing      3.  Pt [facility] and trunk rot to 15 in or less to allow pt to return to PLOF. Eval:  AROM/PROM Right Left   Hip Flex 96 100   Hip IR 16 19   Hip ER 24 20   Trunk Rot 17 in 17 in                      Current: progressing   AROM/PROM Right Left   Hip Flex 100* 100*   Hip IR 32* 32*   Hip ER 24* 20*   Trunk Rot 17in 17in      4.  Pt FOTO score will increase by >12 points to show improvement in function.   Eval:40  Current: 65 - goal MET          PLAN  [x]  Upgrade activities as tolerated     [x]  Continue plan of care  []  Update interventions per flow sheet       []  Discharge due to:_  []  Other:_      Don Lee 5/16/2019  8:00 AM    Future Appointments   Date Time Provider [place]ev[place] Ijeoma   5/21/2019  8:30 AM Tawanna Kimble [facility]   5/23/2019  8:00 AM NATALIE Nicholson [facility]   5/30/2019 10:15 AM NATALIE Lau [facility]

## 2019-05-16 NOTE — PROGRESS NOTES
[facility] at the [address], [place], [address] [facility] [place]  Phone: [phone]      Fax:  [phone]    Progress Note  Patient name: Karol Garcia Start of Care: 19   Referral source: Randy Quintanilla MD : 1950   Medical/Treatment Diagnosis: Hip pain, right [M25.551] Onset Date:few weeks     Prior Hospitalization: see medical history Provider#: [id number]   Medications: Verified on Patient Summary List    Comorbidities: HTN, BMI >30, previous surgeries  Prior Level of Function: pain free with all daily, recreational activities     Visits from Start of Care: 6    Missed Visits: 2    Short Term Goals: STG- To be accomplished in 4  treatment(s):  1.  Pt will be independent with HEP to encourage prophylaxis. Eval:dispensed - to be updated  Current: pt reports compliance-  goal MET     Long Term Goals: LTG- To be accomplished in 12 visit(s):  1.  Pt will improve standing forward flexion to dorsum of foot without pain to increase tolerance to daily activities and donning/doffing shoes. Eval:9 in from floor, no reversal of lordosis, decreased use of gluts to stand, reaches to the right   Current: 8in - progressing      2.  Pt will be able to perform >10 bridges to full height to indicate functional glut and hamstring strength. Eval:1 bridge, decreased height   Current: improved height with adduction - progressing      3.  Pt will imporve bilateral HIP AROM to [facility] and trunk rot to 15 in or less to allow pt to return to PLOF. Eval:  AROM/PROM Right Left   Hip Flex 96 100   Hip IR 16 19   Hip ER 24 20   Trunk Rot 17 in 17 in                      Current: progressing   AROM/PROM Right Left   Hip Flex 100* 100*   Hip IR 32* 32*   Hip ER 24* 20*   Trunk Rot 17in 17in      4.  Pt FOTO score will increase by >12 points to show improvement in function.   Eval:40  Current: 65 - goal MET          Key Functional Changes: Pt presented to therapy with C/C of right anterior hip pain with pain into right groin starting a few weeks ago after repeated bending forward. Pt has attended 6 sessions with focusing on improving hip ROM, strength and overall mobility with pt reporting overall 80% improvement. Pt reporting no longer having pain only soreness in anterior hip after cutting grass and reaching down on the right side. Overall ROM is improving with most limited in trunk rotation and hip ER still. Strength is improving with increased height with bridges however very fatigue with clams. Pt would benefit from continued skilled PT services to address remaining unmet goals.          Updated Goals: to be achieved in 4 weeks:   See unmet above    ASSESSMENT/RECOMMENDATIONS:  [x]Continue therapy per initial plan/protocol at a frequency of  2 x per week for 4 weeks  []Continue therapy with the following recommended changes:_____________________      _____________________________________________________________________  []Discontinue therapy progressing towards or have reached established goals  []Discontinue therapy due to lack of appreciable progress towards goals  []Discontinue therapy due to lack of attendance or compliance  []Await Physician's recommendations/decisions regarding therapy  []Other:________________________________________________________________    Thank you for this referral.   Robe HARDIN [facility] 5/16/2019 8:20 AM  NOTE TO PHYSICIAN:  PLEASE COMPLETE THE ORDERS BELOW AND   FAX TO [facility]: [phone]  If you are unable to process this request in 24 hours please contact our office: [phone]        []  I have read the above report and request that my patient continue as recommended.   []  I have read the above report and request that my patient continue therapy with the following changes/special instructions:________________________________________  []I have read the above report and request that my patient be discharged from therapy.     [de-identified] Signature:____________Date:_________TIME:________    [facility], Date and Time must be completed for valid certification **

## 2019-05-23 ENCOUNTER — HOSPITAL ENCOUNTER (OUTPATIENT)
Dept: PHYSICAL THERAPY | Age: 69
Discharge: HOME | End: 2019-05-23
Payer: MEDICARE

## 2019-05-23 PROCEDURE — 97110 THERAPEUTIC EXERCISES: CPT | Performed by: PHYSICAL THERAPIST

## 2019-05-23 NOTE — PROGRESS NOTES
PT DAILY TREATMENT NOTE    Patient Name: Carlene Arreola  Date:2019  : 1950  [x]  Patient  Verified  Payor: Ed Coronel / Plan: [place] MEDICARE PART A & B / Product Type: Medicare /    In time:0800  Out time:902  Total Treatment Time (min): 62   Total Timed Codes (min): 62  1:1 Treatment Time ( [address] only): 40   Visit #: 7 of 14    Treatment Area: Hip pain, right [M25.551]    SUBJECTIVE  Pain Level (0-10 scale): 0 just sore   Any medication changes, allergies to medications, adverse drug reactions, diagnosis change, or new procedure performed?: [x] No    [] Yes (see summary sheet for update)  Subjective functional status/changes:   [] No changes reported  Since day one I don't have the cramping when bend to right   Just tendernous but no pain     OBJECTIVE        62 min Therapeutic Exercise:  [x] See flow sheet :   Rationale: increase ROM and increase strength to improve the patients ability to perform daily activities with decreased pain and symptom levels         With   [] TE   [] TA   [] neuro   [] other: Patient Education: [x] Review HEP    [] Progressed/Changed HEP based on:   [] positioning   [] body mechanics   [] transfers   [] heat/ice application    [] other:      Other Objective/Functional Measures: see grid      Pain Level (0-10 scale) post treatment: 0    ASSESSMENT/Changes in Function:  Pt tolerated all ex no adverse affect with therapy     Patient will continue to benefit from skilled PT services to modify and progress therapeutic interventions, address functional mobility deficits, address ROM deficits, address strength deficits, analyze and address soft tissue restrictions, analyze and cue movement patterns, analyze and modify body mechanics/ergonomics, assess and modify postural abnormalities and address imbalance/dizziness to attain remaining goals.      []  See Plan of Care  [x]  See progress note/recertification last session   []  See Discharge Summary         Progress towards goals / Updated goals:  Long Term Goals: LTG- To be accomplished in 12 visit(s):  1.  Pt will improve standing forward flexion to dorsum of foot without pain to increase tolerance to daily activities and donning/doffing shoes. Eval:9 in from floor, no reversal of lordosis, decreased use of gluts to stand, reaches to the right   Current: 8in - progressing      2.  Pt will be able to perform >10 bridges to full height to indicate functional glut and hamstring strength. Eval:1 bridge, decreased height   Current: improved height with adduction - progressing      3.  Pt will imporve bilateral HIP AROM to [facility] and trunk rot to 15 in or less to allow pt to return to [facility]. Eval:  AROM/PROM Right Left   Hip Flex 96 100   Hip IR 16 19   Hip ER 24 20   Trunk Rot 17 in 17 in                      Current: progressing   AROM/PROM Right Left   Hip Flex 100* 100*   Hip IR 32* 32*   Hip ER 24* 20*   Trunk Rot 17in 17in      4.  Pt FOTO score will increase by >12 points to show improvement in function.   Eval:40  Current: 65 - goal MET       PLAN  [x]  Upgrade activities as tolerated     [x]  Continue plan of care  []  Update interventions per flow sheet       []  Discharge due to:_  []  Other:_      Charlotte Ayala, PT 5/23/2019  8:08 AM    Future Appointments   Date Time Provider Estevan Raphael   5/30/2019 10:15 AM Hillary Monsalve, PT [facility]

## 2019-05-30 ENCOUNTER — HOSPITAL ENCOUNTER (OUTPATIENT)
Dept: PHYSICAL THERAPY | Age: 69
Discharge: HOME | End: 2019-05-30
Payer: MEDICARE

## 2019-05-30 PROCEDURE — 97110 THERAPEUTIC EXERCISES: CPT

## 2019-05-30 NOTE — PROGRESS NOTES
PT DAILY TREATMENT NOTE    Patient Name: Nicolas Carmen  Date:2019  : 1950  [x]  Patient  Verified  Payor: [place] MEDICARE / Plan: [place] MEDICARE PART A & B / Product Type: Medicare /    In time:10:05  Out time11:00  Total Treatment Time (min): 55  Total Timed Codes (min): 55  1:1 Treatment Time [facility] only):55   Visit #: 9 of 16    Treatment Area: Hip pain, right [M25.551]    SUBJECTIVE  Pain Level (0-10 scale): 0 just sore   Any medication changes, allergies to medications, adverse drug reactions, diagnosis change, or new procedure performed?: [x] No    [] Yes (see summary sheet for update)  Subjective functional status/changes:   [] No changes reported  Im feeling ok    OBJECTIVE        55 min Therapeutic Exercise:  [x] See flow sheet :   Rationale: increase ROM and increase strength to improve the patients ability to perform daily activities with decreased pain and symptom levels         With   [] TE   [] TA   [] neuro   [] other: Patient Education: [x] Review HEP    [] Progressed/Changed HEP based on:   [] positioning   [] body mechanics   [] transfers   [] heat/ice application    [] other:      Other Objective/Functional Measures: see grid      Pain Level (0-10 scale) post treatment: 0    ASSESSMENT/Changes in Function:  Good tolernace to therex, improved eccentric control with HR. Patient will continue to benefit from skilled PT services to modify and progress therapeutic interventions, address functional mobility deficits, address ROM deficits, address strength deficits, analyze and address soft tissue restrictions, analyze and cue movement patterns, analyze and modify body mechanics/ergonomics, assess and modify postural abnormalities and address imbalance/dizziness to attain remaining goals.      []  See Plan of Care  [x]  See progress note/recertification last session   []  See Discharge Summary         Progress towards goals / Updated goals:  Long Term Goals: LTG- To be accomplished in 12 visit(s):  1.  Pt will improve standing forward flexion to dorsum of foot without pain to increase tolerance to daily activities and donning/doffing shoes. Eval:9 in from floor, no reversal of lordosis, decreased use of gluts to stand, reaches to the right   Last PN: 8in    Current: 4 inches, progressing.      2.  Pt will be able to perform >10 bridges to full height to indicate functional glut and hamstring strength. Eval:1 bridge, decreased height   Last PN: improved height with adduction - progressing      3.  Pt will imporve bilateral HIP AROM to [facility] and trunk rot to 15 in or less to allow pt to return to [facility]. Eval:  AROM/PROM Right Left   Hip Flex 96 100   Hip IR 16 19   Hip ER 24 20   Trunk Rot 17 in 17 in                      Last PN  AROM/PROM Right Left   Hip Flex 100* 100*   Hip IR 32* 32*   Hip ER 24* 20*   Trunk Rot 17in 17in      Current: 14 L, 11 L, MET. 4.  Pt FOTO score will increase by >12 points to show improvement in function. Eval:40  Current: 65 - goal MET       PLAN  [x]  Upgrade activities as tolerated     [x]  Continue plan of care  []  Update interventions per flow sheet       []  Discharge due to:_  []  Other:_      Magali Anand PT 5/30/2019  8:08 AM    No future appointments.

## 2019-06-07 ENCOUNTER — HOSPITAL ENCOUNTER (OUTPATIENT)
Dept: PHYSICAL THERAPY | Age: 69
Discharge: HOME | End: 2019-06-07
Payer: MEDICARE

## 2019-06-07 PROCEDURE — 97110 THERAPEUTIC EXERCISES: CPT

## 2019-06-07 NOTE — PROGRESS NOTES
PT DAILY TREATMENT NOTE    Patient Name: Hortencia Taylor  Date:2019  : 1950  [x]  Patient  Verified  Payor: Kendra Connelly / Plan: [place] MEDICARE PART A & B / Product Type: Medicare /    In time:7:50  Out time:8:58  Total Treatment Time (min): 68  Total Timed Codes (min): 68  1:1 Treatment Time ( [address] only): 45   Visit #: 9 of 14    Treatment Area: Hip pain, right [M25.551]    SUBJECTIVE  Pain Level (0-10 scale): 5  Any medication changes, allergies to medications, adverse drug reactions, diagnosis change, or new procedure performed?: [x] No    [] Yes (see summary sheet for update)  Subjective functional status/changes:   [] No changes reported  \"I had to cut the grass with the push mower since the riding mower broke. My hip is hurting this morning since I bent over to put my socks and shoes on. \"    OBJECTIVE       68 min Therapeutic Exercise:  [x] See flow sheet :   Rationale: increase ROM and increase strength to improve the patients ability to perform daily activities with decreased pain and symptom levels            With   [] TE   [] TA   [] neuro   [] other: Patient Education: [x] Review HEP    [] Progressed/Changed HEP based on:   [] positioning   [] body mechanics   [] transfers   [] heat/ice application    [] other:      Other Objective/Functional Measures:   Fatigue with BOSU cross connect and LAQ with reach     Pain Level (0-10 scale) post treatment: 0    ASSESSMENT/Changes in Function: Pt reported increased left hip pain in \"C\" pattern however resolved after exercises today. Pt able to push mow grass without pain last week. Continues to demonstrate decreased functional glut strength.      Patient will continue to benefit from skilled PT services to modify and progress therapeutic interventions, address functional mobility deficits, address ROM deficits, address strength deficits, analyze and cue movement patterns, analyze and modify body mechanics/ergonomics, assess and modify postural abnormalities and instruct in home and community integration to attain remaining goals. []  See Plan of Care  []  See progress note/recertification  []  See Discharge Summary         Progress towards goals / Updated goals:  Long Term Goals: LTG- To be accomplished in 12 visit(s):  1.  Pt will improve standing forward flexion to dorsum of foot without pain to increase tolerance to daily activities and donning/doffing shoes. Eval:9 in from floor, no reversal of lordosis, decreased use of gluts to stand, reaches to the right   Last PN: 8in    Current: 4 inches, progressing.      2.  Pt will be able to perform >10 bridges to full height to indicate functional glut and hamstring strength. Eval:1 bridge, decreased height   Last PN: improved height with adduction - progressing      3.  Pt will imporve bilateral HIP AROM to [facility] and trunk rot to 15 in or less to allow pt to return to OF. Eval:  AROM/PROM Right Left   Hip Flex 96 100   Hip IR 16 19   Hip ER 24 20   Trunk Rot 17 in 17 in                      Last PN  AROM/PROM Right Left   Hip Flex 100* 100*   Hip IR 32* 32*   Hip ER 24* 20*   Trunk Rot 17in 17in      Current: 14 L, 11 L, MET.      4.  Pt FOTO score will increase by >12 points to show improvement in function.   Eval:40  Current: 65 - goal MET          PLAN  [x]  Upgrade activities as tolerated     [x]  Continue plan of care  []  Update interventions per flow sheet       []  Discharge due to:_  []  Other:_      Sarah Galan 6/7/2019  7:54 AM    Future Appointments   Date Time Provider Estevan Raphael   6/12/2019 10:00 AM Gabriela Kimble [facility]   6/18/2019  8:30 AM Gabriela Kimble Allis [facility]   6/25/2019  8:30 AM Gabriela Kimble [facility]   7/2/2019  8:30 AM Gabriela Kimble [facility]

## 2019-06-12 ENCOUNTER — APPOINTMENT (OUTPATIENT)
Dept: PHYSICAL THERAPY | Age: 69
End: 2019-06-12
Payer: MEDICARE

## 2019-06-18 ENCOUNTER — APPOINTMENT (OUTPATIENT)
Dept: PHYSICAL THERAPY | Age: 69
End: 2019-06-18
Payer: MEDICARE

## 2019-06-19 ENCOUNTER — APPOINTMENT (OUTPATIENT)
Dept: PHYSICAL THERAPY | Age: 69
End: 2019-06-19
Payer: MEDICARE

## 2019-06-25 ENCOUNTER — APPOINTMENT (OUTPATIENT)
Dept: PHYSICAL THERAPY | Age: 69
End: 2019-06-25
Payer: MEDICARE

## 2019-06-26 ENCOUNTER — APPOINTMENT (OUTPATIENT)
Dept: PHYSICAL THERAPY | Age: 69
End: 2019-06-26
Payer: MEDICARE

## 2019-07-02 ENCOUNTER — APPOINTMENT (OUTPATIENT)
Dept: PHYSICAL THERAPY | Age: 69
End: 2019-07-02

## 2019-08-06 NOTE — PROGRESS NOTES
[facility] at the [address], [place], [address] [facility]  Phone: [phone]      Fax:  [phone]    Discharge Summary    Patient name: Darius Goodrich     Start of Care: 19  Referral source: Sherrie Schlatter, MD    : 1950  Medical/Treatment Diagnosis: Hip pain, right [M25.551]  Onset Date:few weeks   Prior Hospitalization: see medical history   Provider#: [id number]  Comorbidities:  HTN, BMI >30, previous surgeries  Prior Level of Function: pain free with all daily, recreational activities   Medications: Verified on Patient Summary List    Visits from Start of Care: 9    Missed Visits: 3  Reporting Period : 19 to 19  Long Term Goals: LTG- To be accomplished in 12 visit(s):  1.  Pt will improve standing forward flexion to dorsum of foot without pain to increase tolerance to daily activities and donning/doffing shoes. Eval:9 in from floor, no reversal of lordosis, decreased use of gluts to stand, reaches to the right   Last PN: 8in    Current: 4 inches, progressing.      2.  Pt will be able to perform >10 bridges to full height to indicate functional glut and hamstring strength. Eval:1 bridge, decreased height   Last PN: improved height with adduction - progressing      3.  Pt will imporve bilateral HIP AROM to [facility] and trunk rot to 15 in or less to allow pt to return to PLOF. Eval:  AROM/PROM Right Left   Hip Flex 96 100   Hip IR 16 19   Hip ER 24 20   Trunk Rot 17 in 17 in                      Last PN  AROM/PROM Right Left   Hip Flex 100* 100*   Hip IR 32* 32*   Hip ER 24* 20*   Trunk Rot 17in 17in      Current: 14 L, 11 L, MET.      4.  Pt FOTO score will increase by >12 points to show improvement in function. Eval:40  Current: 65 - goal MET          Assessment/ Summary of Care: Pt presented to therapy with C/C of right anterior hip pain with pain into right groin.  Pt attendend 9 sessions including initial eval improving ROM, strength and overall mobility with pt making good progress towards goals. Pt requested to be on hold with PT for 2 weeks due to new meds from MD however unable to leave message to schedule more appointments after hold. Pt is ready to be discharged at this time with almost meeting all goals last visit and compliance with HEP.      RECOMMENDATIONS:  [x]Discontinue therapy: [x]Patient has reached or is progressing toward set goals      [x]Patient is non-compliant or has abdicated      []Due to lack of appreciable progress towards set goals    Kolton Kimble 8/6/2019 7:49 AM

## 2019-10-08 ENCOUNTER — HOSPITAL ENCOUNTER (OUTPATIENT)
Dept: PREADMISSION TESTING | Age: 69
Discharge: HOME | End: 2019-10-08
Payer: MEDICARE

## 2019-10-08 DIAGNOSIS — M25.512 LEFT SHOULDER PAIN: ICD-10-CM

## 2019-10-08 LAB
ALBUMIN SERPL-MCNC: 3.6 G/DL (ref 3.4–5)
ALBUMIN/GLOB SERPL: 1 {RATIO} (ref 0.8–1.7)
ALP SERPL-CCNC: 89 U/L (ref 45–117)
ALT SERPL-CCNC: 30 U/L (ref 16–61)
ANION GAP SERPL CALC-SCNC: 2 MMOL/L (ref 3–18)
APPEARANCE UR: CLEAR
APTT PPP: 27.9 SEC (ref 23–36.4)
AST SERPL-CCNC: 16 U/L (ref 10–38)
ATRIAL RATE: 52 BPM
BACTERIA SPEC CULT: NORMAL
BASOPHILS # BLD: 0 K/UL (ref 0–0.1)
BASOPHILS NFR BLD: 0 % (ref 0–2)
BILIRUB SERPL-MCNC: 0.8 MG/DL (ref 0.2–1)
BILIRUB UR QL: NEGATIVE
BUN SERPL-MCNC: 10 MG/DL (ref 7–18)
BUN/CREAT SERPL: 9 (ref 12–20)
CALCIUM SERPL-MCNC: 9.2 MG/DL (ref 8.5–10.1)
CALCULATED P AXIS, ECG09: 70 DEGREES
CALCULATED R AXIS, ECG10: 49 DEGREES
CALCULATED T AXIS, ECG11: 17 DEGREES
CHLORIDE SERPL-SCNC: 105 MMOL/L (ref 100–111)
CO2 SERPL-SCNC: 34 MMOL/L (ref 21–32)
COLOR UR: YELLOW
CREAT SERPL-MCNC: 1.09 MG/DL (ref 0.6–1.3)
DIAGNOSIS, 93000: NORMAL
DIFFERENTIAL METHOD BLD: ABNORMAL
EOSINOPHIL # BLD: 0.1 K/UL (ref 0–0.4)
EOSINOPHIL NFR BLD: 1 % (ref 0–5)
ERYTHROCYTE [DISTWIDTH] IN BLOOD BY AUTOMATED COUNT: 14.2 % (ref 11.6–14.5)
ERYTHROCYTE [SEDIMENTATION RATE] IN BLOOD: 7 MM/HR (ref 0–20)
EST. AVERAGE GLUCOSE BLD GHB EST-MCNC: 123 MG/DL
GLOBULIN SER CALC-MCNC: 3.5 G/DL (ref 2–4)
GLUCOSE SERPL-MCNC: 92 MG/DL (ref 74–99)
GLUCOSE UR STRIP.AUTO-MCNC: NEGATIVE MG/DL
HBA1C MFR BLD: 5.9 % (ref 4.2–5.6)
HCT VFR BLD AUTO: 43.9 % (ref 36–48)
HGB BLD-MCNC: 14 G/DL (ref 13–16)
HGB UR QL STRIP: NEGATIVE
INR PPP: 1 (ref 0.8–1.2)
KETONES UR QL STRIP.AUTO: NEGATIVE MG/DL
LEUKOCYTE ESTERASE UR QL STRIP.AUTO: NEGATIVE
LYMPHOCYTES # BLD: 3.4 K/UL (ref 0.9–3.6)
LYMPHOCYTES NFR BLD: 54 % (ref 21–52)
MCH RBC QN AUTO: 28.2 PG (ref 24–34)
MCHC RBC AUTO-ENTMCNC: 31.9 G/DL (ref 31–37)
MCV RBC AUTO: 88.3 FL (ref 74–97)
MONOCYTES # BLD: 0.5 K/UL (ref 0.05–1.2)
MONOCYTES NFR BLD: 7 % (ref 3–10)
NEUTS SEG # BLD: 2.4 K/UL (ref 1.8–8)
NEUTS SEG NFR BLD: 38 % (ref 40–73)
NITRITE UR QL STRIP.AUTO: NEGATIVE
P-R INTERVAL, ECG05: 210 MS
PH UR STRIP: 5 [PH] (ref 5–8)
PLATELET # BLD AUTO: 206 K/UL (ref 135–420)
PMV BLD AUTO: 9.8 FL (ref 9.2–11.8)
POTASSIUM SERPL-SCNC: 4.4 MMOL/L (ref 3.5–5.5)
PROT SERPL-MCNC: 7.1 G/DL (ref 6.4–8.2)
PROT UR STRIP-MCNC: NEGATIVE MG/DL
PROTHROMBIN TIME: 13.2 SEC (ref 11.5–15.2)
Q-T INTERVAL, ECG07: 410 MS
QRS DURATION, ECG06: 84 MS
QTC CALCULATION (BEZET), ECG08: 381 MS
RBC # BLD AUTO: 4.97 M/UL (ref 4.7–5.5)
SERVICE CMNT-IMP: NORMAL
SODIUM SERPL-SCNC: 141 MMOL/L (ref 136–145)
SP GR UR REFRACTOMETRY: 1.02 (ref 1–1.03)
UROBILINOGEN UR QL STRIP.AUTO: 1 EU/DL (ref 0.2–1)
VENTRICULAR RATE, ECG03: 52 BPM
WBC # BLD AUTO: 6.2 K/UL (ref 4.6–13.2)

## 2019-10-08 PROCEDURE — 87641 MR-STAPH DNA AMP PROBE: CPT

## 2019-10-08 PROCEDURE — 80053 COMPREHEN METABOLIC PANEL: CPT

## 2019-10-08 PROCEDURE — 85610 PROTHROMBIN TIME: CPT

## 2019-10-08 PROCEDURE — 83036 HEMOGLOBIN GLYCOSYLATED A1C: CPT

## 2019-10-08 PROCEDURE — 93005 ELECTROCARDIOGRAM TRACING: CPT

## 2019-10-08 PROCEDURE — 87086 URINE CULTURE/COLONY COUNT: CPT

## 2019-10-08 PROCEDURE — 85730 THROMBOPLASTIN TIME PARTIAL: CPT

## 2019-10-08 PROCEDURE — 81003 URINALYSIS AUTO W/O SCOPE: CPT

## 2019-10-08 PROCEDURE — 36415 COLL VENOUS BLD VENIPUNCTURE: CPT

## 2019-10-08 PROCEDURE — 85025 COMPLETE CBC W/AUTO DIFF WBC: CPT

## 2019-10-08 PROCEDURE — 85652 RBC SED RATE AUTOMATED: CPT

## 2019-10-09 LAB
BACTERIA SPEC CULT: NORMAL
SERVICE CMNT-IMP: NORMAL

## 2019-10-17 NOTE — H&P
[address] [facility] [address]  History and Physical Exam    Patient: Melani Moran MRN: [id number]  SSN: [id number]    YOB: 1950  Age: 71 y.o. Sex: male      Subjective:      Chief Complaint: left shoulder pain    History of Present Illness:  Patient complains of left shoulder pain with difficulty performing ROM    Past Medical History:   Diagnosis Date    Arthritis     gout    Chronic pain     general    Hypertension     30 years ago    Other ill-defined conditions(799.89)     high cholestrol    Other ill-defined conditions(799.89)     chronic redness in eyes    Psychiatric disorder     anxiety/depression    Seizures ([facility] [address]     last episode 25 years ago    Sleep apnea     cpap     Past Surgical History:   Procedure Laterality Date    HX GI      colonoscopy x2    HX ORTHOPAEDIC      left knee scope x2     Social History     Occupational History    Not on file   Tobacco Use    Smoking status: Current Some Day Smoker     Years: 30.00    Smokeless tobacco: Never Used    Tobacco comment: 2-3 cigs no smoking within 24 hours of dos   Substance and Sexual Activity    Alcohol use: Not Currently    Drug use: No    Sexual activity: Not on file     Prior to Admission medications    Medication Sig Start Date End Date Taking? Authorizing Provider   diclofenac (VOLTAREN) 1 % gel Apply  to affected area four (4) times daily. Provider, Historical   meloxicam (MOBIC) 15 mg tablet Take 15 mg by mouth daily. Provider, Historical   lisinopril (PRINIVIL, ZESTRIL) 10 mg tablet Take  by mouth two (2) times a day. Provider, Historical   furosemide (LASIX) 20 mg tablet Take  by mouth daily. Indications: visible water retention    Provider, Historical   colchicine (COLCRYS) 0.6 mg tablet Take 0.6 mg by mouth daily. Indications: acute inflammation of the joints due to gout attack    Provider, Historical   Vit C-Cherry-Celery-Grape (TART CHERRY) -68-75-20 mg cap Take  by mouth daily. Provider, Historical   Amlodipine-Olmesartan 10-20 mg Tab Take 1 Cap by mouth daily. Provider, Historical   bisoprolol-hydrochlorothiazide (ZIAC) 5-6.25 mg per tablet Take 1 Tab by mouth daily. Provider, Historical   sertraline (ZOLOFT) 50 mg tablet Take 50 mg by mouth daily. Provider, Historical   hydrocodone-acetaminophen 5-500 mg Cap Take 1 Cap by mouth as needed. Provider, Historical   cholecalciferol, vitamin D3, (VITAMIN D3) 2,000 unit Tab Take 1 Cap by mouth daily. Provider, Historical   traMADol (ULTRAM) 50 mg tablet Take 50 mg by mouth every six (6) hours as needed. Provider, Historical   ezetimibe-simvastatin (VYTORIN 10/40) 10-40 mg per tablet Take 1 Tab by mouth nightly. Provider, Historical       Allergies: Allergies   Allergen Reactions    Pcn [Penicillins] Other (comments)     Dizziness, loss of vision      Family History: Osteoarthritis, diabetes mellitus II, hypertension, heart disease    Review of Systems:  A comprehensive review of systems was negative except for that written in the History of Present Illness. Objective:       Physical Exam:  HEENT: Normocephalic, atraumatic  Lungs:  Clear to auscultation  Heart:   Regular rate and rhythm  Abdomen: Soft  Extremities:  Pain with range of motion and decreased motion of the left shoulder  Neurological: Grossly neurovascularly intact    Assessment:      Left shoulder rotator cuff tear. Plan:       The patient has failed previous efforts of conservative management to include non-steroidal anti-inflammatory medications. Due to the fact that conservative efforts failed, the patient became a candidate for surgical intervention. Proceed with scheduled left shoulder arthroscopy with rotator cuff repair. The various methods of treatment have been discussed with the patient and family. After consideration of risks, benefits, and other options for treatment, the patient has consented to surgical interventions. Questions were answered and preoperative teaching was done by Dr. Deja Cortez.      Signed By: Ravinder Fuchs PA-C     October 17, 2019

## 2019-10-18 ENCOUNTER — ANESTHESIA EVENT (OUTPATIENT)
Dept: SURGERY | Age: 69
End: 2019-10-18
Payer: MEDICARE

## 2019-10-21 ENCOUNTER — HOSPITAL ENCOUNTER (OUTPATIENT)
Age: 69
Setting detail: OUTPATIENT SURGERY
Discharge: HOME | End: 2019-10-21
Attending: ORTHOPAEDIC SURGERY | Admitting: ORTHOPAEDIC SURGERY
Payer: MEDICARE

## 2019-10-21 ENCOUNTER — ANESTHESIA (OUTPATIENT)
Dept: SURGERY | Age: 69
End: 2019-10-21
Payer: MEDICARE

## 2019-10-21 VITALS
TEMPERATURE: 97.8 F | RESPIRATION RATE: 16 BRPM | BODY MASS INDEX: 31.47 KG/M2 | WEIGHT: 184.31 LBS | DIASTOLIC BLOOD PRESSURE: 81 MMHG | OXYGEN SATURATION: 96 % | SYSTOLIC BLOOD PRESSURE: 125 MMHG | HEART RATE: 74 BPM | HEIGHT: 64 IN

## 2019-10-21 LAB — GLUCOSE BLD STRIP.AUTO-MCNC: 92 MG/DL (ref 70–110)

## 2019-10-21 PROCEDURE — 77030020268 HC MISC GENERAL SUPPLY: Performed by: ORTHOPAEDIC SURGERY

## 2019-10-21 PROCEDURE — 77030018725 HC ELECTRD 90DEG DISP J&J -D: Performed by: ORTHOPAEDIC SURGERY

## 2019-10-21 PROCEDURE — 76210000063 HC OR PH I REC FIRST 0.5 HR: Performed by: ORTHOPAEDIC SURGERY

## 2019-10-21 PROCEDURE — 76942 ECHO GUIDE FOR BIOPSY: CPT | Performed by: ORTHOPAEDIC SURGERY

## 2019-10-21 PROCEDURE — 82962 GLUCOSE BLOOD TEST: CPT

## 2019-10-21 PROCEDURE — 77030036565 HC WRP CLDTHER ANK S2SG -A: Performed by: ORTHOPAEDIC SURGERY

## 2019-10-21 PROCEDURE — 77030018673: Performed by: ORTHOPAEDIC SURGERY

## 2019-10-21 PROCEDURE — 64450 NJX AA&/STRD OTHER PN/BRANCH: CPT | Performed by: SPECIALIST

## 2019-10-21 PROCEDURE — 77030018835 HC SOL IRR LR ICUM -A: Performed by: ORTHOPAEDIC SURGERY

## 2019-10-21 PROCEDURE — 76210000020 HC REC RM PH II FIRST 0.5 HR: Performed by: ORTHOPAEDIC SURGERY

## 2019-10-21 PROCEDURE — 77030040361 HC SLV COMPR DVT MDII -B: Performed by: ORTHOPAEDIC SURGERY

## 2019-10-21 PROCEDURE — 77030020269 HC MISC IMPL: Performed by: ORTHOPAEDIC SURGERY

## 2019-10-21 PROCEDURE — 77030034478 HC TU IRR ARTHRO PT ARTH -B: Performed by: ORTHOPAEDIC SURGERY

## 2019-10-21 PROCEDURE — 76010000153 HC OR TIME 1.5 TO 2 HR: Performed by: ORTHOPAEDIC SURGERY

## 2019-10-21 PROCEDURE — 74011250636 HC RX REV CODE- 250/636: Performed by: ORTHOPAEDIC SURGERY

## 2019-10-21 PROCEDURE — 76060000034 HC ANESTHESIA 1.5 TO 2 HR: Performed by: ORTHOPAEDIC SURGERY

## 2019-10-21 PROCEDURE — 74011250636 HC RX REV CODE- 250/636: Performed by: NURSE ANESTHETIST, CERTIFIED REGISTERED

## 2019-10-21 PROCEDURE — 74011250636 HC RX REV CODE- 250/636: Performed by: PHYSICIAN ASSISTANT

## 2019-10-21 PROCEDURE — 74011000250 HC RX REV CODE- 250: Performed by: NURSE ANESTHETIST, CERTIFIED REGISTERED

## 2019-10-21 PROCEDURE — 77030020782 HC GWN BAIR PAWS FLX 3M -B: Performed by: ORTHOPAEDIC SURGERY

## 2019-10-21 PROCEDURE — 77030006877 HC BLD SHV FLL RAD S&N -B: Performed by: ORTHOPAEDIC SURGERY

## 2019-10-21 PROCEDURE — 77030012508 HC MSK AIRWY LMA AMBU -A: Performed by: SPECIALIST

## 2019-10-21 PROCEDURE — 77030016060 HC NDL NRV BLK TELE -A: Performed by: SPECIALIST

## 2019-10-21 PROCEDURE — 77030012824 HC WRP CLD THER DJOR -B: Performed by: ORTHOPAEDIC SURGERY

## 2019-10-21 PROCEDURE — 74011250636 HC RX REV CODE- 250/636: Performed by: SPECIALIST

## 2019-10-21 RX ORDER — SODIUM CHLORIDE, SODIUM LACTATE, POTASSIUM CHLORIDE, CALCIUM CHLORIDE 600; 310; 30; 20 MG/100ML; MG/100ML; MG/100ML; MG/100ML
125 INJECTION, SOLUTION INTRAVENOUS CONTINUOUS
Status: DISCONTINUED | OUTPATIENT
Start: 2019-10-21 | End: 2019-10-21 | Stop reason: HOSPADM

## 2019-10-21 RX ORDER — ROPIVACAINE HYDROCHLORIDE 5 MG/ML
INJECTION, SOLUTION EPIDURAL; INFILTRATION; PERINEURAL AS NEEDED
Status: DISCONTINUED | OUTPATIENT
Start: 2019-10-21 | End: 2019-10-21 | Stop reason: HOSPADM

## 2019-10-21 RX ORDER — LABETALOL HCL 20 MG/4 ML
SYRINGE (ML) INTRAVENOUS AS NEEDED
Status: DISCONTINUED | OUTPATIENT
Start: 2019-10-21 | End: 2019-10-21 | Stop reason: HOSPADM

## 2019-10-21 RX ORDER — HYDROMORPHONE HYDROCHLORIDE 2 MG/ML
0.5 INJECTION, SOLUTION INTRAMUSCULAR; INTRAVENOUS; SUBCUTANEOUS
Status: DISCONTINUED | OUTPATIENT
Start: 2019-10-21 | End: 2019-10-21 | Stop reason: CLARIF

## 2019-10-21 RX ORDER — HYDROMORPHONE HYDROCHLORIDE 1 MG/ML
0.5 INJECTION, SOLUTION INTRAMUSCULAR; INTRAVENOUS; SUBCUTANEOUS
Status: DISCONTINUED | OUTPATIENT
Start: 2019-10-21 | End: 2019-10-21 | Stop reason: HOSPADM

## 2019-10-21 RX ORDER — CEFAZOLIN SODIUM/WATER 2 G/20 ML
2 SYRINGE (ML) INTRAVENOUS ONCE
Status: COMPLETED | OUTPATIENT
Start: 2019-10-21 | End: 2019-10-21

## 2019-10-21 RX ORDER — CHOLECALCIFEROL (VITAMIN D3) 125 MCG
2 CAPSULE ORAL
COMMUNITY

## 2019-10-21 RX ORDER — ONDANSETRON 2 MG/ML
INJECTION INTRAMUSCULAR; INTRAVENOUS AS NEEDED
Status: DISCONTINUED | OUTPATIENT
Start: 2019-10-21 | End: 2019-10-21 | Stop reason: HOSPADM

## 2019-10-21 RX ORDER — NALOXONE HYDROCHLORIDE 0.4 MG/ML
0.1 INJECTION, SOLUTION INTRAMUSCULAR; INTRAVENOUS; SUBCUTANEOUS
Status: DISCONTINUED | OUTPATIENT
Start: 2019-10-21 | End: 2019-10-21 | Stop reason: HOSPADM

## 2019-10-21 RX ORDER — MIDAZOLAM HYDROCHLORIDE 1 MG/ML
INJECTION, SOLUTION INTRAMUSCULAR; INTRAVENOUS AS NEEDED
Status: DISCONTINUED | OUTPATIENT
Start: 2019-10-21 | End: 2019-10-21 | Stop reason: HOSPADM

## 2019-10-21 RX ORDER — FENTANYL CITRATE 50 UG/ML
25 INJECTION, SOLUTION INTRAMUSCULAR; INTRAVENOUS
Status: DISCONTINUED | OUTPATIENT
Start: 2019-10-21 | End: 2019-10-21 | Stop reason: HOSPADM

## 2019-10-21 RX ORDER — PROPOFOL 10 MG/ML
INJECTION, EMULSION INTRAVENOUS AS NEEDED
Status: DISCONTINUED | OUTPATIENT
Start: 2019-10-21 | End: 2019-10-21 | Stop reason: HOSPADM

## 2019-10-21 RX ORDER — GLYCOPYRROLATE 0.2 MG/ML
INJECTION INTRAMUSCULAR; INTRAVENOUS AS NEEDED
Status: DISCONTINUED | OUTPATIENT
Start: 2019-10-21 | End: 2019-10-21 | Stop reason: HOSPADM

## 2019-10-21 RX ORDER — LIDOCAINE HYDROCHLORIDE 20 MG/ML
INJECTION, SOLUTION EPIDURAL; INFILTRATION; INTRACAUDAL; PERINEURAL AS NEEDED
Status: DISCONTINUED | OUTPATIENT
Start: 2019-10-21 | End: 2019-10-21 | Stop reason: HOSPADM

## 2019-10-21 RX ORDER — ONDANSETRON 2 MG/ML
4 INJECTION INTRAMUSCULAR; INTRAVENOUS ONCE
Status: DISCONTINUED | OUTPATIENT
Start: 2019-10-21 | End: 2019-10-21 | Stop reason: HOSPADM

## 2019-10-21 RX ORDER — OXYCODONE AND ACETAMINOPHEN 5; 325 MG/1; MG/1
1 TABLET ORAL AS NEEDED
Status: DISCONTINUED | OUTPATIENT
Start: 2019-10-21 | End: 2019-10-21 | Stop reason: HOSPADM

## 2019-10-21 RX ORDER — SODIUM CHLORIDE, SODIUM LACTATE, POTASSIUM CHLORIDE, CALCIUM CHLORIDE 600; 310; 30; 20 MG/100ML; MG/100ML; MG/100ML; MG/100ML
50 INJECTION, SOLUTION INTRAVENOUS CONTINUOUS
Status: DISCONTINUED | OUTPATIENT
Start: 2019-10-21 | End: 2019-10-21 | Stop reason: HOSPADM

## 2019-10-21 RX ADMIN — LABETALOL 20 MG/4 ML (5 MG/ML) INTRAVENOUS SYRINGE 2.5 MG: at 13:04

## 2019-10-21 RX ADMIN — Medication 2 G: at 12:47

## 2019-10-21 RX ADMIN — MIDAZOLAM 2 MG: 1 INJECTION INTRAMUSCULAR; INTRAVENOUS at 12:47

## 2019-10-21 RX ADMIN — ONDANSETRON HYDROCHLORIDE 4 MG: 2 INJECTION INTRAMUSCULAR; INTRAVENOUS at 12:50

## 2019-10-21 RX ADMIN — ROPIVACAINE HYDROCHLORIDE 25 ML: 5 INJECTION, SOLUTION EPIDURAL; INFILTRATION; PERINEURAL at 11:56

## 2019-10-21 RX ADMIN — PROPOFOL 180 MG: 10 INJECTION, EMULSION INTRAVENOUS at 12:57

## 2019-10-21 RX ADMIN — GLYCOPYRROLATE 0.2 MG: 0.2 INJECTION INTRAMUSCULAR; INTRAVENOUS at 12:50

## 2019-10-21 RX ADMIN — MIDAZOLAM 2 MG: 1 INJECTION INTRAMUSCULAR; INTRAVENOUS at 11:51

## 2019-10-21 RX ADMIN — LIDOCAINE HYDROCHLORIDE 60 MG: 20 INJECTION, SOLUTION EPIDURAL; INFILTRATION; INTRACAUDAL; PERINEURAL at 12:57

## 2019-10-21 NOTE — PERIOP NOTES
TRANSFER - OUT REPORT:    Verbal report given to [facility] RN(name) on Peterson Rodrigez  being transferred to phase 2(unit) for routine post - op       Report consisted of patients Situation, Background, Assessment and   Recommendations(SBAR). Information from the following report(s) OR Summary and MAR was reviewed with the receiving nurse. Lines:   Peripheral IV 10/21/19 Right Hand (Active)   Site Assessment Clean, dry, & intact 10/21/2019  2:51 PM   Phlebitis Assessment 0 10/21/2019  2:51 PM   Infiltration Assessment 0 10/21/2019  2:51 PM   Dressing Status Clean, dry, & intact 10/21/2019  2:51 PM   Dressing Type Transparent 10/21/2019  2:51 PM   Hub Color/Line Status Infusing 10/21/2019  2:51 PM   Alcohol Cap Used No 10/21/2019  9:12 AM        Opportunity for questions and clarification was provided.       Patient transported with:   [employer]

## 2019-10-21 NOTE — PERIOP NOTES
Reviewed PTA medication list with patient/caregiver and patient/caregiver denies any additional medications. Patient admits to having a responsible adult care for them at home for at least 24 hours after surgery. Patient encouraged to use gown warming system and informed that using said warming gown to regulate body temperature prior to a procedure has been shown to help reduce the risks of blood clots and infection. Dual skin assessment & fall risk band verification completed with Henry Grant RN.

## 2019-10-21 NOTE — ANESTHESIA PREPROCEDURE EVALUATION
Relevant Problems   No relevant active problems       Anesthetic History   No history of anesthetic complications            Review of Systems / Medical History  Patient summary reviewed, nursing notes reviewed and pertinent labs reviewed    Pulmonary        Sleep apnea (not totally compliant): CPAP  Smoker (rare cigarette - none today)         Neuro/Psych     seizures (last 25 years ago - off all meds): well controlled         Cardiovascular    Hypertension: well controlled          Hyperlipidemia    Exercise tolerance: >4 METS     GI/Hepatic/Renal  Within defined limits              Endo/Other        Arthritis     Other Findings              Physical Exam    Airway  Mallampati: III  TM Distance: 4 - 6 cm  Neck ROM: normal range of motion   Mouth opening: Normal     Cardiovascular               Dental  No notable dental hx       Pulmonary                 Abdominal         Other Findings            Anesthetic Plan    ASA: 3  Anesthesia type: general      Post-op pain plan if not by surgeon: peripheral nerve block single    Induction: Intravenous  Anesthetic plan and risks discussed with: Patient and Spouse      Interscalene Block d/w patient and wife: infection, nerve injury, bleeding, failed block - he wishes to proceed.

## 2019-10-21 NOTE — BRIEF OP NOTE
BRIEF OPERATIVE NOTE    Date of Procedure: 10/21/2019   Preoperative Diagnosis: COMPLETE ROTATOR CUFF TEAR OR RUPTURE OF LEFT SHOULDER, NOT SPECIFIED AS TRAUMATIC, UNSPECIFIED ROTATOR CUFF TEAR OR RUPTURE OF UNSPECIFIED SHOULDER  Postoperative Diagnosis:  ROTATOR CUFF TEAR,   LEFT SHOULDER    Procedure(s):  LEFT SHOULDER SCOPE WITH ROTATOR CUFF REPAIR  Surgeon(s) and Role: Indy Martin MD - Primary         Surgical Assistant: Jazzy Keller    Surgical Staff:  Circ-1: Tammy Jarrell RN  Circ-Relief: Merary Camacho RN  Physician Assistant: Radha Thomson PA-C  Scrub Tech-1: Emilia Paredes  Scrub RN-1: Carmella Rivera RN  Event Time In Time Out   Incision Start 1314    Incision Close 1432      Anesthesia: General   Estimated Blood Loss: Less than 20mL  Specimens: * No specimens in log *   Findings: Rotator cuff tear   Complications: None  Implants:   Implant Name Type Inv.  Item Serial No.  Lot No. LRB No. Used Action   [address] [id number] Left 1 Implanted

## 2019-10-21 NOTE — ANESTHESIA PROCEDURE NOTES
Peripheral Block    Start time: 10/21/2019 11:51 AM  End time: 10/21/2019 11:56 AM  Performed by: Cameron Barksdale MD  Authorized by: Cameron Barksdale MD       Pre-procedure: Indications: at surgeon's request and post-op pain management    Preanesthetic Checklist: patient identified, risks and benefits discussed, site marked, timeout performed, anesthesia consent given and patient being monitored    Timeout Time: 11:51          Block Type:   Block Type:   Interscalene  Laterality:  Left  Monitoring:  Standard ASA monitoring, continuous pulse ox, frequent vital sign checks, heart rate, responsive to questions and oxygen  Injection Technique:  Single shot  Procedures: ultrasound guided    Patient Position: supine  Prep: chlorhexidine    Location:  Interscalene  Needle Type:  Stimuplex  Needle Gauge:  21 G  Needle Localization:  Ultrasound guidance    Assessment:  Number of attempts:  1  Injection Assessment:  Incremental injection every 5 mL, local visualized surrounding nerve on ultrasound, negative aspiration for blood, no intravascular symptoms, no paresthesia and ultrasound image on chart  Patient tolerance:  Patient tolerated the procedure well with no immediate complications

## 2019-10-21 NOTE — PERIOP NOTES
Spoke with  from anesthesia regarding patient's blood pressure and last dose of beta blocker. No orders at this time.

## 2019-10-21 NOTE — DISCHARGE INSTRUCTIONS
DISCHARGE SUMMARY from Nurse    PATIENT INSTRUCTIONS:    After general anesthesia or intravenous sedation, for 24 hours or while taking prescription Narcotics:  · Limit your activities  · Do not drive and operate hazardous machinery  · Do not make important personal or business decisions  · Do  not drink alcoholic beverages  · If you have not urinated within 8 hours after discharge, please contact your surgeon on call. Report the following to your surgeon:  · Excessive pain, swelling, redness or odor of or around the surgical area  · Temperature over 100.5  · Nausea and vomiting lasting longer than 4 hours or if unable to take medications  · Any signs of decreased circulation or nerve impairment to extremity: change in color, persistent  numbness, tingling, coldness or increase pain  · Any questions    What to do at Home:  [address]    If you experience any of the following symptoms heavy bleeding, fevers, severe pain, circulation changes, please follow up with dr Nina Pollard    *  Please give a list of your current medications to your Primary Care Provider. *  Please update this list whenever your medications are discontinued, doses are      changed, or new medications (including over-the-counter products) are added. *  Please carry medication information at all times in case of emergency situations. These are general instructions for a healthy lifestyle:    No smoking/ No tobacco products/ Avoid exposure to second hand smoke  Surgeon General's Warning:  Quitting smoking now greatly reduces serious risk to your health.     Obesity, smoking, and sedentary lifestyle greatly increases your risk for illness    A healthy diet, regular physical exercise & weight monitoring are important for maintaining a healthy lifestyle    You may be retaining fluid if you have a history of heart failure or if you experience any of the following symptoms:  Weight gain of 3 pounds or more overnight or 5 pounds in a week, increased swelling in our hands or feet or shortness of breath while lying flat in bed. Please call your doctor as soon as you notice any of these symptoms; do not wait until your next office visit. The discharge information has been reviewed with the patient and caregiver. The patient and caregiver verbalized understanding. Discharge medications reviewed with the patient and caregiver and appropriate educational materials and side effects teaching were provided. ___________________________________________________________________________________________________________________________________Post operative Instructions for Shoulder Arthroscopy with Rotator Cuff Repair  1. You may remove the surgical dressing 2 days after surgery. It is okay to shower and get the incision wet at this time, but no soaking or bathing. Steri strips may be removed if necessary. You can place band-aids over the incisions and apply an Ace wrap if necessary. 2. No active range of motion of the arm until further notified by your surgeon. You must wear the sling at all times. 3. All patients should begin straight leg raises the day of surgery, approximately 30 every hour. 4. It is important to get up and walk around for 3-5 minutes every hour while you are awake. This will decrease the risk of blood clot. 5. If you develop fever of greater than 101.4 or chills, please contact our office immediately, [phone].   6. Take the pain medication as prescribed. Maximum dose of Acetaminophen in a 24 hr period is 4 grams. Larger doses can cause liver impairment. Be aware that over the counter products can contain acetaminophen. 7. Follow up in 7-10 days. Call to schedule appointment. [phone].      Patient armband removed and shredded

## 2019-10-21 NOTE — INTERVAL H&P NOTE
H&P Update:  Javed Aragon was seen and examined. History and physical has been reviewed. The patient has been examined. There have been no significant clinical changes since the completion of the originally dated History and Physical.  Patient identified by surgeon; surgical site was confirmed by patient and surgeon.

## 2019-10-21 NOTE — ANESTHESIA POSTPROCEDURE EVALUATION
Post-Anesthesia Evaluation and Assessment    Cardiovascular Function/Vital Signs  Visit Vitals  /77   Pulse 84   Temp 37.1 °C (98.7 °F)   Resp 22   Ht 5' 4\" (1.626 m)   Wt 83.6 kg (184 lb 5 oz)   SpO2 96%   BMI 31.64 kg/m²       Patient is status post Procedure(s):  LEFT SHOULDER SCOPE WITH ROTATOR CUFF REPAIR. Nausea/Vomiting: Controlled. Postoperative hydration reviewed and adequate. Pain:  Pain Scale 1: Numeric (0 - 10) (10/21/19 1455)  Pain Intensity 1: 0 (10/21/19 1442)   Managed. Neurological Status:   Neuro (WDL): Within Defined Limits (10/21/19 1442)   At baseline. Mental Status and Level of Consciousness: Baseline and appropriate for discharge. Pulmonary Status:   O2 Device: Room air (10/21/19 1455)   Adequate oxygenation and airway patent. Complications related to anesthesia: None    Post-anesthesia assessment completed. No concerns. Patient has met all discharge requirements.     Signed By: Jair Cox MD    October 21, 2019

## 2019-10-21 NOTE — OP NOTES
[address] [facility] [address]  Arthroscopic Rotator Cuff Repair    Patient: Devin Landeros MRN: [id number]  SSN: [id number]    YOB: 1950  Age: 71 y.o. Sex: male      Date of Surgery: 10/21/2019   Preoperative Diagnosis: COMPLETE ROTATOR CUFF TEAR OR RUPTURE OF LEFT SHOULDER, NOT SPECIFIED AS TRAUMATIC, UNSPECIFIED ROTATOR CUFF TEAR OR RUPTURE OF UNSPECIFIED SHOULDER   Postoperative Diagnosis:  ROTATOR CUFF TEAR,   LEFT SHOULDER   [facility]: [facility]  Surgeon: Henrietta Alexander MD  Assistant:   Jazzy Keller PA-C  Circ-1: Tammy Jarrell RN  Circ-Relief: Merary Camacho RN  Physician Assistant: Radha Thomson PA-C  Scrub Tech-1: Emilia Paredes  Scrub RN-1: Carmella Rivera RN    Anesthesia: General and Scalene Nerve Block    Procedure: Arthroscopic Subacromial decompression and Rotator Cuff Repair lt shoulder - double row    Findings:  Rotator cuff tear lt shoulder    Estimated Blood Loss: 50 cc    Specimens: None    Complications: None    Implants:   Implant Name Type Inv. Item Serial No.  Lot No. LRB No. Used Action   [address] [id number] Left 1 Implanted       DESCRIPTION OF PROCEDURE: The patient was taken to the operating room,  placed in supine position on the operating table. A satisfactory general anesthesia was established, the patient was moved into the beach chair position in preparation for shoulder arthroscopy. After moving into the beach chair position, shoulder was prepped with ChloraPrep and draped in a sterile fashion. Outline of the acromion, clavicle, AC joint, coracoid process, as well as, expected portals had been marked in the preoperative hold area, was still visible and remarked at this time. Beginning at a point 3 cm distal, 3 cm medial to the posterolateral edge of the acromion, spinal needle was inserted into the shoulder. Shoulder was instilled with saline. Good backflow was obtained.  Incision was made. Arthroscope was inserted into the shoulder on the first pass. Shoulder was evaluated. The humeral head biceps tendon, glenoid fossa, labrum and inferior recess were evaluated and noted to be within normal limits. There was evidence of the rotator cuff tearing. The arthroscope was redirected into the subacromial space. A lateral portal was made 5 cm lateral, 1 cm posterior to the anterior edge of the acromion. Bone cutting full radius resector was inserted into the subacromial space, subacromial decompression done at this time. Approximately 10 mm of anterior inferior acromion was removed and smoothed in an anterior and posterior direction. Amount of resection was based on preoperative x-rays. The rotator cuff was evaluated and there was noted to be a full-thickness tear of rotator cuff. Edges of the rotator cuff were freshened using the shaver. Bleeding was controlled as it was encountered using the DePuy Mitek vapor. The normal attachment of the rotator cuff onto the lateral humerus was debrided free of soft tissue and made ready for placement of an anchor. The spinal needle stylus was used to localize the position for the passport device. A 1 cm incision was made and dilated the a 3 cm passport was placed. . A punch hole was made in the proximal humerus where the tissue had been debrided. Based on the ease of passage of the passage of the punch a 4.5 mm Arthrex anchor was selected. The fiber tape was placed though the rotator cuff. The lateral row anchor was localized punched and the fiber tapes were placed through the anchor. The anchor was placed down to the bone. The fiber tape was tensioned and the anchor was advanced. The fiber tape was cut at the level of the anchor. Excellent opposition of the rotator cuff with the lateral humerus was obtained. The arm was placed through a Range of motion and the attachment remained quite stable. Arthroscope and shaver were removed.     The lateral portal was closed using a 4-0 nylon horizontal mattress suture. Wounds were dressed with Xeroform, followed by 4 x 4's, ABD and Microfoam tape. The patient tolerated the procedure well, was transferred onto the recovery bed and taken to recovery room in stable condition.

## 2020-03-02 RX ORDER — DIPHENHYDRAMINE HYDROCHLORIDE 50 MG/ML
50 INJECTION, SOLUTION INTRAMUSCULAR; INTRAVENOUS ONCE
Status: CANCELLED | OUTPATIENT
Start: 2020-03-02 | End: 2020-03-02

## 2020-03-02 RX ORDER — SODIUM CHLORIDE 0.9 % (FLUSH) 0.9 %
5-40 SYRINGE (ML) INJECTION AS NEEDED
Status: CANCELLED | OUTPATIENT
Start: 2020-03-02

## 2020-03-02 RX ORDER — EPINEPHRINE 0.1 MG/ML
1 INJECTION INTRACARDIAC; INTRAVENOUS
Status: CANCELLED | OUTPATIENT
Start: 2020-03-02 | End: 2020-03-03

## 2020-03-02 RX ORDER — SODIUM CHLORIDE 0.9 % (FLUSH) 0.9 %
5-40 SYRINGE (ML) INJECTION EVERY 8 HOURS
Status: CANCELLED | OUTPATIENT
Start: 2020-03-02

## 2020-03-02 RX ORDER — ATROPINE SULFATE 0.1 MG/ML
0.5 INJECTION INTRAVENOUS
Status: CANCELLED | OUTPATIENT
Start: 2020-03-02 | End: 2020-03-03

## 2020-03-02 RX ORDER — DEXTROMETHORPHAN/PSEUDOEPHED 2.5-7.5/.8
1.2 DROPS ORAL
Status: CANCELLED | OUTPATIENT
Start: 2020-03-02

## 2020-03-03 ENCOUNTER — HOSPITAL ENCOUNTER (OUTPATIENT)
Age: 70
Setting detail: OUTPATIENT SURGERY
Discharge: HOME | End: 2020-03-03
Attending: INTERNAL MEDICINE | Admitting: INTERNAL MEDICINE
Payer: MEDICARE

## 2020-03-03 VITALS
RESPIRATION RATE: 16 BRPM | HEART RATE: 67 BPM | TEMPERATURE: 97.2 F | BODY MASS INDEX: 31 KG/M2 | DIASTOLIC BLOOD PRESSURE: 82 MMHG | OXYGEN SATURATION: 93 % | HEIGHT: 64 IN | WEIGHT: 181.6 LBS | SYSTOLIC BLOOD PRESSURE: 133 MMHG

## 2020-03-03 PROCEDURE — G0500 MOD SEDAT ENDO SERVICE >5YRS: HCPCS | Performed by: INTERNAL MEDICINE

## 2020-03-03 PROCEDURE — 76040000007: Performed by: INTERNAL MEDICINE

## 2020-03-03 PROCEDURE — 74011250636 HC RX REV CODE- 250/636: Performed by: INTERNAL MEDICINE

## 2020-03-03 PROCEDURE — 77030040361 HC SLV COMPR DVT MDII -B: Performed by: INTERNAL MEDICINE

## 2020-03-03 RX ORDER — MIDAZOLAM HYDROCHLORIDE 1 MG/ML
.25-5 INJECTION, SOLUTION INTRAMUSCULAR; INTRAVENOUS
Status: DISCONTINUED | OUTPATIENT
Start: 2020-03-03 | End: 2020-03-03 | Stop reason: HOSPADM

## 2020-03-03 RX ORDER — FENTANYL CITRATE 50 UG/ML
100 INJECTION, SOLUTION INTRAMUSCULAR; INTRAVENOUS
Status: DISCONTINUED | OUTPATIENT
Start: 2020-03-03 | End: 2020-03-03 | Stop reason: HOSPADM

## 2020-03-03 RX ORDER — FLUMAZENIL 0.1 MG/ML
0.2 INJECTION INTRAVENOUS
Status: DISCONTINUED | OUTPATIENT
Start: 2020-03-03 | End: 2020-03-03 | Stop reason: HOSPADM

## 2020-03-03 RX ORDER — SODIUM CHLORIDE 9 MG/ML
1000 INJECTION, SOLUTION INTRAVENOUS CONTINUOUS
Status: DISCONTINUED | OUTPATIENT
Start: 2020-03-03 | End: 2020-03-03 | Stop reason: HOSPADM

## 2020-03-03 RX ORDER — NALOXONE HYDROCHLORIDE 0.4 MG/ML
0.4 INJECTION, SOLUTION INTRAMUSCULAR; INTRAVENOUS; SUBCUTANEOUS
Status: DISCONTINUED | OUTPATIENT
Start: 2020-03-03 | End: 2020-03-03 | Stop reason: HOSPADM

## 2020-03-03 RX ADMIN — SODIUM CHLORIDE 1000 ML: 900 INJECTION, SOLUTION INTRAVENOUS at 10:06

## 2020-03-03 NOTE — H&P
Assessment/Plan  # Detail Type Description    1. Assessment Chronic idiopathic constipation (K59.04). Patient Plan 71year old male referred by Dr. Rosalina Jackson seen for constipation. BM are once a day and pt have a difficult time defecating. BM are described #1 on [place] stool scale. No blood or mucus noted. No abdominal pain noted. He doesn't feel completely empty. No narcotic use. PLAN:  Pt advised to increase water and Fiber intake. Exercise to increase intestinal motility, Linzess 2145mcg daily. Miralax daily, may need to do an enema as needed. Advised to consume cooked vegetables and fresh fruits and vegetables. 2. Assessment Personal history of colonic polyps (Z86.010). Patient Plan  71year old  male patient of Dr. Rosalina Jackson  for colonoscopy. Last colonoscopy completed 10/8/12  by Dr. Tata Barboza. Impression and pathology revealed small hemorrhoids were found, mild diverticulosis found in the sigmoid colon, three sessile polyps (tubular adenoma) rectal polyp (hyperplastic polyp)  found in the hepatic flexure and rectum all polyps removed by snare cautery polypectomy internal hemorrhoids found. BM once daily. Normal color, soft, formed in consistency. No evidence of blood or mucus, changes in bowel pattern or constipation issues. Patient reports no allergies or herbal consumption. Medical hx includes HTN, joint pain, hyperlipidemia, gout. No significant cardiac, pulmonary, GI, , renal, hepatic, musculoskeletal, or endocrine issues. Surgical hx remarkable for bilateral  rotator cuff repair, left knee repair,  No family history of colorectal CA. Denies tobacco and ETOH use. No significant weight gains or losses in the last 3-6 months. No heat or cold intolerances. Patient states  no N/V/D, fever, chills, sick contacts, SOB, abdominal or chest pains. No dysphagia, appetite is good which consists of 3 meals per day.     PLAN: Colonoscopy Scheduled     He has average risk for colon cancer and asymptomatic. He would be having his screening colonoscopy. I explained to him the procedure of colonoscopy and the risks involved which include but not limited to reaction to sedation, bleeding, perforation, infection or missing a lesion if bowels are not well prepped or are unusually tortuous. He agreed to proceed with the procedure and answered his questions. thoroughly. I gave him the approved prep  to clean his bowels. I advised him to take if needed, extra laxatives for few days before in the event he is on the constipated side to assure adequate bowel prep. Told him not take his medications in the morning of the procedure because they would be flushed out with the prep but he can take them more confidently after the procedure. I advised him to bring all his medication with him. This 71year old male presents for Hx polyp/colon cancer, Hx of polyps and Constipation. History of Present Illness:  1. Hx polyp/colon cancer   Prior screening:  colonoscopy. Risk Factors: Cousins. Associated symptoms include constipation. Pertinent negatives include abdominal pain, change in bowel habits, change in stool caliber, decreased appetite, diarrhea, melena, nausea, rectal bleeding, vomiting, weight gain and weight loss. 2.  Hx of polyps   71year old  male patient of Dr. Daniel Garvey  for colonoscopy. Last colonoscopy completed  10/8/12  by Dr. Robert Solis and pathology revealed Small hemorrhoids were found, mild diverticulosis found in the sigmoid colon, three sessile polyps ( tubular adenoma) rectal polyp ( hyperplastic polyp)  found in the hepatic flexure and rectum all polyps removed by snare cautery polypectomy . internal hemorrhoids found. 3.  Constipation   Onset: 6 months ago. Severity level severe. The patient describes it as difficulty passing and hard. It occurs weekly. The problem is with no change. Denies aggravating symptoms. Relieving factors include laxatives.   He is also experiencing bloating, flatulence and pain with passing stool. Pertinent negatives include abdominal pain, anorexia, back pain, black tarry stools, bleeding with bowel movement, change in appetite, change in stool caliber, change in stool pattern, nausea, sedentary activity, vomiting, weight gain and weight loss. PROBLEM LIST:     Problem Description Onset Date Chronic Clinical Status Notes   Crystal arthropathy  N     Shoulder joint pain  N     Osteoarthritis of knee  N     Hypertension 2014 N     Sleep disorder 2014 N     Depression 2014 N     Hyperlipidemia 2014 N     Rhinitis 10/19/2015 N     Gout 10/19/2015 N     Shoulder pain 10/19/2015 N     Elevated cholesterol 10/19/2015 N               INTERIM HISTORY  Type Reason Management Date Admit Admit from Discharge Discharge to Outcome                Type Facility Discharge Dx1 Discharge Dx2 Discharge Dx3 Discharge Dx4 Comment              PAST MEDICAL/SURGICAL HISTORY   (Detailed)    Disease/disorder Onset Date Management Date Comments   colon polyps       left knee arthroscopy x2         Shoulder     Hypertension             Family History  (Detailed)  Relationship Family Member Name  Age at Death Condition Onset Age Cause of Death    grandfather   Family history of Tuberculosis  N    grandmother   Family history of Hypertension  N       Family history of Coronary artery disease  N       Family history of Diabetes mellitus  N   Father  N  Alive and well  N   Mother  N  Alive and well  N     Family History Comments  Relationship Family Member Name Condition Comments   Mother  Alive and well [place]       Social History:  (Detailed)  Tobacco use reviewed. The patient is right-handed. Preferred language is [place]. MARITAL STATUS/FAMILY/SOCIAL SUPPORT  Marital status:    Tobacco use status: Cigarette smoker. Smoking status: Light tobacco smoker.     TOBACCO SCREENING:  Patient has used tobacco. SMOKING STATUS  Type Smoking Status Usage Per Day Years Used Pack Years Total Pack Years   Cigarette Light tobacco smoker         TOBACCO CESSATION INFORMATION  Date Counseled By Order Status Description Code Tobacco Cessation Information   09/08/2015 Ramona Thiago Tobacco cessation counseling completed   Smoking effects education     TOBACCO/VAPING EXPOSURE  There is passive smoke exposure. The patient has had exposure to second hand smoke in the home environment. Tobacco type: Cigarette  Comments: pt. is noncommital when discussing smoking. States he has decreased his cigarette smoking by a few, but doesn't want to discuss anything further. Attempted to discuss to completely stop, but he changed the focus of talking about his history. ALCOHOL  There is a history of alcohol use. Type: Beer and liquor. consumed socially. CAFFEINE  The patient uses caffeine: coffee - 2 cups a day. LIFESTYLE  Moderate activity level. Exercises occasionally. DIET  healthy.             Medications (active prior to today)  Medication Name Sig Description Start Date Stop Date Refilled Rx Elsewhere   Vitamin D3 2,000 unit Tab  09/26/2012   N   Aspirin Low Dose 81 mg tablet,delayed release take 1 tablet by oral route  every day 01/01/2014   N   ProAir HFA 90 mcg/actuation aerosol inhaler inhale 2 puff by inhalation route  every 4 - 6 hours as needed 11/07/2018 11/07/2018 N   Tussionex Pennkinetic ER 10 mg-8 mg/5 mL suspension,extended release take 5 milliliter by oral route  every 12 hours 12/03/2018   N   Colcrys 0.6 mg tablet take 1 tablet by oral route 2 times every day 02/26/2019 02/26/2019 N   benazepril 40 mg tablet take 1 tablet by oral route 2 times every day 04/01/2019 04/01/2019 N   EZETIMIBE-SIMVASTATIN 10-40 MG TAKE 1 TABLET BY MOUTH EVERY DAY 04/15/2019  04/15/2019 N   BISOPROLOL-HCTZ 10-6.25 MG TAB TAKE 1 TABLET BY MOUTH EVERY DAY 04/18/2019 04/18/2019 N   Percocet 5 mg-325 mg tablet take 1 tablet by oral route  every 6 hours as needed 06/26/2019 06/26/2019 N   cyclobenzaprine 10 mg tablet TAKE 1/2 TO 1 TABLET BY MOUTH TWICE A DAY EVERY DAY AS NEEDED 07/15/2019  07/15/2019 N   meloxicam 15 mg tablet take 1 tablet by oral route  every day 07/15/2019   N   cyclobenzaprine 5 mg tablet take 1 -2 tab prn at bed time 07/26/2019   N   sertraline 25 mg tablet take 1 tablet by oral route  every day 08/12/2019 08/12/2019 N   Voltaren 1 % topical gel apply (2G)  by topical route 4 times every day to the affected area(s) 09/03/2019 09/03/2019 N   FUROSEMIDE 20 MG TABLET TAKE 1 TABLET BY MOUTH EVERY DAY 09/24/2019 09/24/2019 N   hydrocodone 10 mg-chlorpheniramine 8 mg/5 mL oral susp extend. rel 12hr take 5 milliliter by oral route  every 12 hours 11/14/2019   N     Patient Status   Completed with information received for patient in a summary of care record. Medication Reconciliation  Medications reconciled today.   Medication Reviewed  Adherence Medication Name Sig Desc Elsewhere Status   taking as directed Vitamin D3 2,000 unit Tab  N Verified   taking as directed Aspirin Low Dose 81 mg tablet,delayed release take 1 tablet by oral route  every day N Verified   taking as directed ProAir HFA 90 mcg/actuation aerosol inhaler inhale 2 puff by inhalation route  every 4 - 6 hours as needed N Verified   taking as directed Tussionex Pennkinetic ER 10 mg-8 mg/5 mL suspension,extended release take 5 milliliter by oral route  every 12 hours N Verified   taking as directed Colcrys 0.6 mg tablet take 1 tablet by oral route 2 times every day N Verified   taking as directed benazepril 40 mg tablet take 1 tablet by oral route 2 times every day N Verified   taking as directed EZETIMIBE-SIMVASTATIN 10-40 MG TAKE 1 TABLET BY MOUTH EVERY DAY N Verified   taking as directed BISOPROLOL-HCTZ 10-6.25 MG TAB TAKE 1 TABLET BY MOUTH EVERY DAY N Verified   taking as directed Percocet 5 mg-325 mg tablet take 1 tablet by oral route  every 6 hours as needed N Verified   taking as directed cyclobenzaprine 10 mg tablet TAKE 1/2 TO 1 TABLET BY MOUTH TWICE A DAY EVERY DAY AS NEEDED N Verified   taking as directed meloxicam 15 mg tablet take 1 tablet by oral route  every day N Verified   taking as directed cyclobenzaprine 5 mg tablet take 1 -2 tab prn at bed time N Verified   taking as directed sertraline 25 mg tablet take 1 tablet by oral route  every day N Verified   taking as directed Voltaren 1 % topical gel apply (2G)  by topical route 4 times every day to the affected area(s) N Verified   taking as directed FUROSEMIDE 20 MG TABLET TAKE 1 TABLET BY MOUTH EVERY DAY N Verified   taking as directed hydrocodone 10 mg-chlorpheniramine 8 mg/5 mL oral susp extend. rel 12hr take 5 milliliter by oral route  every 12 hours N Verified   taking as directed Suprep Bowel Prep Kit 17.5 gram-3.13 gram-1.6 gram oral solution Take as prescribed by physician N Verified     Medications (Added, Continued or Stopped today)  Start Date Medication Directions PRN Status PRN Reason Instruction Stop Date   01/01/2014 Aspirin Low Dose 81 mg tablet,delayed release take 1 tablet by oral route  every day N      04/01/2019 benazepril 40 mg tablet take 1 tablet by oral route 2 times every day N      04/18/2019 BISOPROLOL-HCTZ 10-6.25 MG TAB TAKE 1 TABLET BY MOUTH EVERY DAY N      02/26/2019 Colcrys 0.6 mg tablet take 1 tablet by oral route 2 times every day N      07/15/2019 cyclobenzaprine 10 mg tablet TAKE 1/2 TO 1 TABLET BY MOUTH TWICE A DAY EVERY DAY AS NEEDED N      07/26/2019 cyclobenzaprine 5 mg tablet take 1 -2 tab prn at bed time N      04/15/2019 EZETIMIBE-SIMVASTATIN 10-40 MG TAKE 1 TABLET BY MOUTH EVERY DAY N      09/24/2019 FUROSEMIDE 20 MG TABLET TAKE 1 TABLET BY MOUTH EVERY DAY N      11/14/2019 hydrocodone 10 mg-chlorpheniramine 8 mg/5 mL oral susp extend. rel 12hr take 5 milliliter by oral route  every 12 hours N      07/15/2019 meloxicam 15 mg tablet take 1 tablet by oral route  every day N      06/26/2019 Percocet 5 mg-325 mg tablet take 1 tablet by oral route  every 6 hours as needed N      11/07/2018 ProAir HFA 90 mcg/actuation aerosol inhaler inhale 2 puff by inhalation route  every 4 - 6 hours as needed N      08/12/2019 sertraline 25 mg tablet take 1 tablet by oral route  every day N      01/16/2020 Suprep Bowel Prep Kit 17.5 gram-3.13 gram-1.6 gram oral solution Take as prescribed by physician N      12/03/2018 Tussionex Pennkinetic ER 10 mg-8 mg/5 mL suspension,extended release take 5 milliliter by oral route  every 12 hours N      09/26/2012 Vitamin D3 2,000 unit Tab  N      09/03/2019 Voltaren 1 % topical gel apply (2G)  by topical route 4 times every day to the affected area(s) N  5-100gram tubes      Allergies:  Ingredient Reaction (Severity) Medication Name Comment   PENICILLINS Altered Heart Rate     Reviewed, no changes. Review of System  System Neg/Pos Details   Constitutional Negative Chills, Fever, Malaise, Sedentary activity, Weight gain and Weight loss. ENMT Negative Ear infections, Nasal congestion, Sinus Infection and Sore throat. Eyes Negative Double vision and Eye pain. Respiratory Negative Asthma, Chronic cough, Dyspnea, Pleuritic pain and Wheezing. Cardio Negative Chest pain, Edema and Irregular heartbeat/palpitations. GI Positive Bloating, Constipation, Flatulence, Painful defecation. GI Negative Abdominal pain, Anorexia, Black tarry stools, Bleeding with bowel movement, Change in appetite, Change in bowel habits, Change in stool caliber, Change in stool pattern, Decreased appetite, Diarrhea, Dysphagia, Heartburn, Hematemesis, Hematochezia, Melena, Nausea, Rectal bleeding, Reflux and Vomiting.  Negative Back pain, Dysuria, Hematuria, Urinary frequency, Urinary incontinence and Urinary retention. Neuro Negative Dizziness, Headache, Numbness, Tremors and Vertigo. Psych Negative Anxiety, Depression and Increased stress.    Integumentary Negative Hives, Pruritus and Rash. MS Negative Back pain, Joint pain and Myalgia. Hema/Lymph Negative Easy bleeding, Easy bruising and Lymphadenopathy. Allergic/Immuno Negative Chemicals in work place, Contact allergy, Food allergies, Immunosuppression and Seasonal allergies. Reproductive Negative Penile discharge and Sexual dysfunction. Vital Signs   Height  Time ft in cm Last Measured Height Position   2:54 PM 5.0 4.00 162.56 01/16/2020 Standing     MAP (Calculated) Arterial Line 1 BP (mmHg) BP Patient Position Resp SpO2 O2 Device O2 Flow Rate (L/min) Pre/Post Ductal Weight       03/03/20 1118 -- 76 -- -- -- -- 14 96 % -- -- -- --   03/03/20 0954 97.7 °F (36.5 °C) 65 130/76 94 -- -- 16 96 % Room air -- -- 82.4 kg (181 lb 9.6 oz)       PHYSICAL EXAM:  Exam Findings Details   Constitutional Normal Well developed. Eyes Normal Conjunctiva - Right: Normal, Left: Normal. Sclera - Right: Normal, Left: Normal.   Nasopharynx Normal Lips/teeth/gums - Normal. Buccal mucosa - Normal.   Neck Exam Normal Inspection - Normal. Palpation - Normal. Thyroid gland - Normal.   Respiratory Normal Inspection - Normal. Auscultation - Normal.   Cardiovascular Normal Regular rate and rhythm. No murmurs, gallops, or rubs. Vascular Normal Pulses - Radial: Normal, Brachial: Normal, Dorsalis pedis: Normal, Posterior tibial: Normal.   Abdomen Normal Inspection - Normal. Appliance(s) - None. Abdominal muscles - Normal. Auscultation - Normal. Percussion - Normal. Anterior palpation - Normal, No guarding. Umbilicus - Normal. No abdominal tenderness. No hepatic enlargement. No spleen enlargement. No hernia. No ascites. Suarez's sign - Negative. No hepatic tenderness. No hepatic bruit. Skin Normal Inspection - Normal.   Musculoskeletal Normal Hands/Wrist - Right: Normal, Left: Normal.   Extremity Normal No edema.    Neurological Normal Fine motor skills - Normal.   Psychiatric Normal Orientation - Oriented to time, place, person & situation. Appropriate mood and affect. No change in H&P. He didn't try the Linzess we gave him at the office for constipation. He doesn't remember well.

## 2020-03-03 NOTE — DISCHARGE INSTRUCTIONS
Hortencia Taylor  [id number]  1950    COLON DISCHARGE INSTRUCTIONS    Discomfort:  Redness at IV site- apply warm compress to area; if redness or soreness persist- contact your physician  There may be a slight amount of blood passed from the rectum  Gaseous discomfort- walking, belching will help relieve any discomfort  You may not operate a vehicle til the next day. You may not engage in an occupation involving machinery or appliances til the next day. You may not drink alcoholic beverages til the next day. DIET:   High fiber diet. ACTIVITY:  You may not  resume your normal daily activities til the next day. it is recommended that you spend the remainder of the day resting -  avoid any strenuous activity. CALL M.D.  IF ANY SIGN OF:   Increasing pain, nausea, vomiting  Abdominal distension (swelling)  New increased bleeding (oral or rectal)  Fever (chills)  Pain in chest area  Bloody discharge from nose or mouth  Shortness of breath    You may  take any Advil, Aspirin, Ibuprofen, Motrin, Aleve, or Goodys but preferably Tylenol as needed for pain. Post procedure diagnosis:  HEMORRHOID;     Follow-up Instructions: Your follow up colonoscopy will be in 10 years.         Chauncey Marquez MD  March 3, 2020       DISCHARGE SUMMARY from Nurse    The following personal items collected during your admission are returned to you:   Dental Appliance: Dental Appliances: None  Vision: Visual Aid: Glasses(wears occasionally)  Hearing Aid:    Jewelry:    Clothing:    Other Valuables:    Valuables sent to safe:              PATIENT INSTRUCTIONS:    After general anesthesia or intravenous sedation, for 24 hours or while taking prescription Narcotics:  · Limit your activities  · Do not drive and operate hazardous machinery  · Do not make important personal or business decisions  · Do  not drink alcoholic beverages  · If you have not urinated within 8 hours after discharge, please contact your surgeon on call.    Report the following to your surgeon:  · Excessive pain, swelling, redness or odor of or around the surgical area  · Temperature over 100.5  · Nausea and vomiting lasting longer than 4 hours or if unable to take medications  · Any signs of decreased circulation or nerve impairment to extremity: change in color, persistent  numbness, tingling, coldness or increase pain  · Any questions      No orders of the defined types were placed in this encounter. What to do at Home:  Recommended activity: as above,     If you experience any of the following symptoms as above, please follow up with Dr. Janell Weber. *  Please give a list of your current medications to your Primary Care Provider. *  Please update this list whenever your medications are discontinued, doses are      changed, or new medications (including over-the-counter products) are added. *  Please carry medication information at all times in case of emergency situations. These are general instructions for a healthy lifestyle:    No smoking/ No tobacco products/ Avoid exposure to second hand smoke    Surgeon General's Warning:  Quitting smoking now greatly reduces serious risk to your health. Obesity, smoking, and sedentary lifestyle greatly increases your risk for illness    A healthy diet, regular physical exercise & weight monitoring are important for maintaining a healthy lifestyle    You may be retaining fluid if you have a history of heart failure or if you experience any of the following symptoms:  Weight gain of 3 pounds or more overnight or 5 pounds in a week, increased swelling in our hands or feet or shortness of breath while lying flat in bed. Please call your doctor as soon as you notice any of these symptoms; do not wait until your next office visit.     Recognize signs and symptoms of STROKE:    F-face looks uneven    A-arms unable to move or move unevenly    S-speech slurred or non-existent    T-time-call 911 as soon as signs and symptoms begin-DO NOT go       Back to bed or wait to see if you get better-TIME IS BRAIN. The discharge information has been reviewed with the patient and spouse. The patient and spouse verbalized understanding. Warning Signs of HEART ATTACK     Call 911 if you have these symptoms:   Chest discomfort. Most heart attacks involve discomfort in the center of the chest that lasts more than a few minutes, or that goes away and comes back. It can feel like uncomfortable pressure, squeezing, fullness, or pain.  Discomfort in other areas of the upper body. Symptoms can include pain or discomfort in one or both arms, the back, neck, jaw, or stomach.  Shortness of breath with or without chest discomfort.  Other signs may include breaking out in a cold sweat, nausea, or lightheadedness. Don't wait more than five minutes to call 911 - MINUTES MATTER! Fast action can save your life. Calling 911 is almost always the fastest way to get lifesaving treatment. Emergency Medical Services staff can begin treatment when they arrive -- up to an hour sooner than if someone gets to the hospital by car. The discharge information has been reviewed with the patient and caregiver. The patient and caregiver verbalized understanding. Discharge medications reviewed with the patient and guardian and appropriate educational materials and side effects teaching were provided.     Patient armband removed and shredded

## 2021-04-27 NOTE — PROCEDURES
[facility]  Colonoscopy Procedure Report  _______________________________________________________  Patient: Estefany Rasmussen                                         Attending Physician: Faraz Whitaker MD    Patient ID: [id number]                                      Referring Physician: Ronald French MD    Exam Date: March 3, 2020 _______________________________________________________      Introduction: A  71 y.o. male patient, presents for outpatient Colonoscopy    Indications: patient of Dr. Regina Miles  for colonoscopy. Last colonoscopy completed 10/8/12  by my self: small hemorrhoids were found, mild diverticulosis found in the sigmoid colon, three sessile polyps (tubular adenoma) rectal polyp (hyperplastic polyp)  found in the hepatic flexure and rectum all polyps removed by snare cautery polypectomy internal hemorrhoids found. He has one BM once daily. For a couple of months he had a transient constipation where he was having one bm every 2 to 3 days. He was taking Mag citrate as needed to treat his constipation. He didn't try the Linzess we gave him at the office on Jan 16, 2020 . Medical hx : HTN, joint pain, hyperlipidemia, gout. Surgical hx remarkable for bilateral  rotator cuff repair, left knee repair,  No family history of colorectal CA Two 3ed cousins had colon cancer in their 63's. Consent: The benefits, risks, and alternatives to the procedure were discussed and informed consent was obtained from the patient. Preparation: EKG, pulse, pulse oximetry and blood pressure were monitored throughout the procedure. ASA Classification: Class 1 - . The heart is an S1-S2 and regular heart rate and rhythm. Lungs are clear to auscultation and percussion. Abdomen is soft, nondistended, and nontender. Mental Status: awake, alert, and oriented to person, place, and time    Medications:  · Fentanyl 100 mcg IV before procedure. · Versed 2 mg IV throughout the procedure.     Rectal Exam: Normal Quality 130: Documentation Of Current Medications In The Medical Record: Current Medications Documented Rectal Exam. No Blood. Prostate small not nodular. Pathology Specimens: No specimens removed. Procedure: The colonoscope was passed with ease through the anus under direct visualization and advanced to the cecum. The scope was withdrawn and the mucosa was carefully examined. The quality of the preparation was excellent. The views were excellent. The patient's toleration of the procedure was excellent. Retroflexion was preformed in the ascending colon and hepatic flexure. The exam was done twice to the cecum. Total time is 12 minutes and withdrawal time is 9 minutes. Findings:    Rectum:   Small internal hemorrhoids  Sigmoid:   normal   Descending Colon:   A couple of small descending colon diverticula  Transverse Colon:   Normal  Ascending Colon:   One small diverticulum in the ascending colon  Cecum:   Normal  Terminal Ileum:   Normal      Unplanned Events: There were no unplanned events. Estimated Blood Loss: None  Impressions:    Slightly tortuous sigmoid colon. Small internal hemorrhoids. A couple of small descending colon diverticula. One small diverticulum in the ascending colon. Normal Mucosa. No polyps found. Complications: None; patient tolerated the procedure well. Recommendations:  · Discharge home when standard parameters are met. · Resume a high fiber diet. · Colonoscopy recommendation in 10 years.   · Take Miralax or linzes as needed or on regular basis to treat constipation    Procedure Codes:    · COLONOSCOPY [[id number] (Type: Custom)]    Endoscope Information:  Model Number(s)    [id number]   Assistant: None  Signed By: Ferdinand Sapp MD Date: March 3, 2020 Quality 226: Preventive Care And Screening: Tobacco Use: Screening And Cessation Intervention: Patient screened for tobacco use and is an ex/non-smoker Detail Level: Detailed Quality 110: Preventive Care And Screening: Influenza Immunization: Influenza Immunization Administered during Influenza season

## 2022-02-02 ENCOUNTER — HOSPITAL ENCOUNTER (OUTPATIENT)
Dept: PHYSICAL THERAPY | Age: 72
Discharge: HOME | End: 2022-02-02
Payer: MEDICARE

## 2022-02-02 PROCEDURE — 97535 SELF CARE MNGMENT TRAINING: CPT

## 2022-02-02 PROCEDURE — 97162 PT EVAL MOD COMPLEX 30 MIN: CPT

## 2022-02-02 PROCEDURE — 97110 THERAPEUTIC EXERCISES: CPT

## 2022-02-02 NOTE — PROGRESS NOTES
PT DAILY TREATMENT NOTE/Hip/Knee/Ankle EVAL 10-18    Patient Name: Jocelyn Hough  Date:2022  : 1950  [x]  Patient  Verified  [place]or: [place] MED[place] / Plan: [place] MED[place]ARE PART A & B / Product Type: Medicare /    In time:10:50  Out time:11:28  Total Treatment Time (min): 38  Visit #: 1 of 16    Medicare/BCBS Only   Total Timed Codes (min):  38 1:1 Treatment Time:  38       Treatment Area: Right hip pain [M25.551]      SUBJECTIVE  Pain Level (0-10 scale): 3-4/10  []constant [x]intermittent []improving [x]worsening []no change since onset    Any medication changes, allergies to medications, adverse drug reactions, diagnosis change, or new procedure performed?: [x] No    [] Yes (see summary sheet for update)  Subjective functional status/changes:     PLOF: functionally independent, no AD, somewhat active lifestyle, enjoys working in his lawn  Limitations to PLOF: Intermittent pain with walking; pain with sitting; I'm doing everything I normally do its just painful'  Mechanism of Injury: Insidious onset; chronic pain; pain started back up about 2 months ago; reports x-rays revealed severe arthritic changes  Current symptoms/Complaints: 3-4/10 at best with laying on left side and on back and standing; 10/10 at worst with sitting; pt reports they are somewhat able to sleep through the night secondary to pain; denies recent falls but does endorse occasional LOB   Previous Treatment/Compliance: previous outpatient PT at this clinic with good results  PMHx/Surgical Hx: OA, Osteoporosis, Pre-diabetic, Depression, RA, Right RCR , Left RCR , hx of left knee scope  Work Hx: Retired; previously worked as a long shoreman; 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 8 min Self Care: Education regarding activity modification and positioning   Rationale:    increase ROM and increase strength to improve the patients ability to perform ADLs with reduced pain and symptoms. With   [x] TE   [] TA   [] neuro   [] other: Patient Education: [x] Review HEP    [] Progressed/Changed HEP based on:   [] positioning   [] body mechanics   [] transfers   [] heat/ice application    [] other:        General Evaluation    Gait: decreased velocity; decreased arm swing bilat; decreased heel strike; decresaed step length   Posture: Thoracic flattened; lumbar increased; pelvic alignment WNL  Palpation/Sensation: No TTP however hypertonicity noted in right hip flexor, quads, and glutes    ROM:                                        AROM                              Hip Left Right   Flexion 94 80   ER 20 16   IR 25 19                                   Strength (MMT):                                            Hip Left (1-5) Right (1-5)   Hip Flexion 4 4   Hip Extension NT NT   Hip ABD (seated) 4+ 4+   Hip ADD (seated) 4+ 4+   Hip ER 4 4   Hip IR 4- 4     Knee Left (1-5) Right (1-5)   Knee Flexion 4+ 4+   Knee Extension 5 5   Ankle PF 4+ 4+   Ankle DF 5 5   Other     Notes: Trunk compensation with MMT; poor hamstring and glut activation during PPT; cramping in left hamstrings during bridges    Special Tests:    Hip Left Right   Steve Test + +   90/90 SLR + +   Scours - +   Martha + +          Pain Level (0-10 scale) post treatment: 0/10    ASSESSMENT/Changes in Function: Patient is a 69 yo male who presents to [facility] with c/o chronic right anterior hip and groin pain, with most recent exacerbation beginning 2 months ago.  Patient reports intermittent pain that reaches 10/10 at worst with prolonged sitting and walking and 3-4/10 at best with rest and standing. Patient reports he recently had x-rays performed that displayed severe arthritic changes. Patient was previously seen in this clinic in June 2021 for the same symptoms. Signs and symptoms consistent with hip OA. Patient demonstrates decreased ROM, decreased strength, impaired posture, impaired gait mechancis, impaired glut and hamstring activation, hypertonicity in his right glutes and quads,  pain and decreased functional mobility tolerance. Patient would benefit from skilled physical therapy to address listed deficits, reduce pain, and maximize functional potential.     Patient will continue to benefit from skilled PT services to modify and progress therapeutic interventions, address functional mobility deficits, address ROM deficits, address strength deficits, analyze and address soft tissue restrictions, analyze and cue movement patterns, analyze and modify body mechanics/ergonomics, assess and modify postural abnormalities and instruct in home and community integration to attain remaining goals. [x]  See Plan of Care  []  See progress note/recertification  []  See Discharge Summary         Progress towards goals / Updated goals:  Short Term Goals: To be accomplished in 4 weeks:  1. Patient will be independent and compliant with HEP to progress toward goals and restore functional mobility. Eval Status: issued at eval    2. Pt will have painfree right hip AROM WFL to aid in functional mechanics for ambulation/ADLs. Eval Status:   Hip Left Right   Flexion 94 80   ER 20 16   IR 25 19          3. Patient will demonstrate 10x bridges to full height to indicate function glut and hamsting activation and strength. Current: Performed 5x bridges with decreased height and poor posterior chain activation    Long Term Goals: To be accomplished in 8 weeks:  1.  Patient will improve FOTO score by 11 points to improve functional tolerance for performing ADLs. Eval Status: FOTO 52  FOTO score = an established functional score where 100 = no disability      3. Pt will have 5/5 bilat LE strength to return to goals of ambulation and ADLs. Eval Status:                             Hip Left (1-5) Right (1-5)   Hip Flexion 4 4   Hip Extension NT NT   Hip ABD (seated) 4+ 4+   Hip ADD (seated) 4+ 4+   Hip ER 4 4   Hip IR 4- 4     Knee Left (1-5) Right (1-5)   Knee Flexion 4+ 4+   Knee Extension 5 5   Ankle PF 4+ 4+   Ankle DF 5 5   Other     Notes: Trunk compensation with MMT; poor hamstring and glut activation during PPT; cramping in left hamstrings during bridges        4. Patient will improve pain in right hip to 1-2/10 at worst to improve activity tolerance and restore prior level of function.   Eval Status: 10/10 at worst    PLAN  [x]  Upgrade activities as tolerated     [x]  Continue plan of care  [x]  Update interventions per flow sheet       []  Discharge due to:_  []  Other:_      Glorious Duty, PT 2/2/2022  10:49 AM

## 2022-02-02 NOTE — PROGRESS NOTES
[facility] at the [address], [place], [address] [facility]  Phone: [phone]      Fax:  [phone]             Plan of Care/ Statement of Necessity for Physical Therapy Services      Patient name: Eros Recinos Start of Care: 2022   Referral source: Georjean Landau, MD : 1950    Medical Diagnosis: Right hip pain [M25.551]   Onset Date: ~2 months ago   Treatment Diagnosis: Right hip pain   Prior Hospitalization: see medical history Provider#: [id number]   Medications: Verified on Patient summary List    Comorbidities: OA, Osteoporosis, Pre-diabetic, Depression, RA, Right RCR 2018, Left RCR , hx of left knee scope   Prior Level of Function: functionally independent, no AD, somewhat active lifestyle, enjoys working in his lawn      The Plan of Care and following information is based on the information from the initial evaluation. Assessment/ key information: Patient is a 71 yo male who presents to [facility] with c/o chronic right anterior hip and groin pain, with most recent exacerbation beginning 2 months ago. Patient reports intermittent pain that reaches 10/10 at worst with prolonged sitting and walking and 3-4/10 at best with rest and standing. Patient reports he recently had x-rays performed that displayed severe arthritic changes. Patient was previously seen in this clinic in 2021 for the same symptoms. Signs and symptoms consistent with hip OA. Patient demonstrates decreased ROM, decreased strength, impaired posture, impaired gait mechancis, impaired glut and hamstring activation, hypertonicity in his right glutes and quads,  pain and decreased functional mobility tolerance.  Patient would benefit from skilled physical therapy to address listed deficits, reduce pain, and maximize functional potential.     Evaluation Complexity History HIGH Complexity :3+ comorbidities / personal factors will impact the outcome/ POC ; Examination MEDIUM Complexity : 3 Standardized tests and measures addressing body structure, function, activity limitation and / or participation in recreation  ;Presentation MEDIUM Complexity : Evolving with changing characteristics  ; Clinical Decision Making MEDIUM Complexity : FOTO score of 26-74 FOTO score = an established functional score where 100 = no disability  Overall Complexity Rating: MEDIUM  Problem List: pain affecting function, decrease ROM, decrease strength, edema affecting function, impaired gait/ balance, decrease ADL/ functional abilitiies, decrease activity tolerance, decrease flexibility/ joint mobility and decrease transfer abilities   Treatment Plan may include any combination of the following: Therapeutic exercise, Therapeutic activities, Neuromuscular re-education, Physical agent/modality, Gait/balance training, Manual therapy, Patient education, Self Care training, Functional mobility training, Home safety training and Stair training  Vasopnuematic compression justification:  Per bilateral girth measures taken and listed above the edema is considered significant and having an impact on the patient's strength, balance, gait, transfers, self care and ADLs  Patient / Family readiness to learn indicated by: asking questions, trying to perform skills and interest  Persons(s) to be included in education: patient (P)  Barriers to Learning/Limitations: None  Patient Goal (s): No pain  Patient Self Reported Health Status: fair  Rehabilitation Potential: good    Short Term Goals: To be accomplished in 4 weeks:  1. Patient will be independent and compliant with HEP to progress toward goals and restore functional mobility. Eval Status: issued at eval     2. Pt will have painfree right hip AROM WFL to aid in functional mechanics for ambulation/ADLs. Eval Status:   Hip Left Right   Flexion 94 80   ER 20 16   IR 25 19           3.  Patient will demonstrate 10x bridges to full height to indicate function glut and hamsting activation and strength. Current: Performed 5x bridges with decreased height and poor posterior chain activation     Long Term Goals: To be accomplished in 8 weeks:  1. Patient will improve FOTO score by 11 points to improve functional tolerance for performing ADLs. Eval Status: FOTO 52  FOTO score = an established functional score where 100 = no disability        3. Pt will have 5/5 bilat LE strength to return to goals of ambulation and ADLs. Eval Status:                             Hip Left (1-5) Right (1-5)   Hip Flexion 4 4   Hip Extension NT NT   Hip ABD (seated) 4+ 4+   Hip ADD (seated) 4+ 4+   Hip ER 4 4   Hip IR 4- 4      Knee Left (1-5) Right (1-5)   Knee Flexion 4+ 4+   Knee Extension 5 5   Ankle PF 4+ 4+   Ankle DF 5 5   Other       Notes: Trunk compensation with MMT; poor hamstring and glut activation during PPT; cramping in left hamstrings during bridges           4. Patient will improve pain in right hip to 1-2/10 at worst to improve activity tolerance and restore prior level of function. Eval Status: 10/10 at worst      Frequency / Duration: Patient to be seen 2 times per week for 8 weeks. Patient/ Caregiver education and instruction: Diagnosis, prognosis, self care, activity modification and exercises   [x]  Plan of care has been reviewed with PTA    Certification Period: 2/2/22-5/3/22  Lb Wilder, PT 2/2/2022 2:56 PM  _____________________________________________________________________  I certify that the above Therapy Services are being furnished while the patient is under my care. I agree with the treatment plan and certify that this therapy is necessary.     [de-identified] Signature:____________Date:_________TIME:________                                      Alaina Lanza MD    ** Signature, Date and Time must be completed for valid certification **    Please sign and return to [facility] at the [address] [place], [address] [facility]       Phone: [phone]      Fax:  [phone]

## 2022-02-08 ENCOUNTER — HOSPITAL ENCOUNTER (OUTPATIENT)
Dept: PHYSICAL THERAPY | Age: 72
Discharge: HOME | End: 2022-02-08
Payer: MEDICARE

## 2022-02-08 PROCEDURE — 97530 THERAPEUTIC ACTIVITIES: CPT

## 2022-02-08 PROCEDURE — 97112 NEUROMUSCULAR REEDUCATION: CPT

## 2022-02-08 PROCEDURE — 97110 THERAPEUTIC EXERCISES: CPT

## 2022-02-08 NOTE — PROGRESS NOTES
PT DAILY TREATMENT NOTE    Patient Name: Elise Storey  Date: 2022  : 1950  [x]  Patient  Verified  Payor: Damian Nowak / Plan: [place] MEDICARE PART A & B / Product Type: Medicare /    In Time: 10:45  Out Time: 11:33  Total Treatment Time (min): 48  Total Timed Codes (min): 48  1:1 Treatment Time ( [address] only): 37   Visit #:     Treatment Dx: Right hip pain [M25.551]    SUBJECTIVE  Pre-Treatment Pain Level (0-10 scale): 4    Any medication changes, allergies to medications, adverse drug reactions, diagnosis change, or new procedure performed?: [x] No    [] Yes (see summary sheet for update)    Subjective Functional Status/Changes:  [] No changes reported  Patient c/o stiffness in his left hip today as well as 4/10 pain. OBJECTIVE    23 min Therapeutic Exercise:  [x] See flow sheet   Rationale: increase ROM, increase strength, and decrease pain to assist in improving patient's ability to perform functional activities and ADLs     12 min Therapeutic Activity:  [x] See flow sheet   Rationale: increase ROM, increase strength, and improve coordination to assist in improving patient's ability to perform functional activities and ADLs    8 min Neuromuscular Re-Education:  [x] See flow sheet   Rationale: improve coordination, improve balance/proprioception, improve posture, and improve core stabilization to assist in improving patient's ability to perform functional activities and ADLs as well as to improve core stabilization during static/dynamic movements      With   [x] TE   [] TA   [] neuro   [] other: Patient Education: [x] Review HEP    [] Progressed/Changed HEP based on:   [] positioning   [] body mechanics   [] transfers   [] heat/ice application    [] other:      Other Objective/Functional Measures: N/A     Pain Level (0-10 scale) Post Treatment: 2    ASSESSMENT/Changes in Function:  Patient responded well to today's treatment without any adverse reactions.   Patient challenged with exercises secondary to decreased LE strength, but was able to perform all planned exercises. He is most challenged with NBOS activities such as tandem stance and SLS secondary to insufficient ankle/hip strength bilaterally, but this should improve with further stabilization exercises. Patient also challenged with the eccentric lowering during stands to sits, but this improved as table was slightly raised from 19\" to 20\" in height. Patient will continue to benefit from skilled PT services to further modify and progress therapeutic interventions, address functional mobility deficits, address ROM deficits, address strength deficits, analyze and address soft tissue restrictions, analyze and cue movement patterns, analyze and modify body mechanics/ergonomics, assess and modify postural abnormalities, and instruct in home and community integration to attain remaining goals. [x]  See Plan of Care  []  See Progress Note/Recertification  []  See Discharge Summary         Progress Towards Goals/Updated Goals:    Short Term Goals: To be accomplished in 4 weeks:  1. Patient will be independent and compliant with HEP to progress toward goals and restore functional mobility. Eval Status: issued at eval  Current: Patient reports daily performance with his HEP, but did require cueing throughout treatment today for proper form during exercises. 2/8/22, in progress     2. Pt will have painfree right hip AROM WFL to aid in functional mechanics for ambulation/ADLs. Eval Status:   Hip Left Right   Flexion 94 80   ER 20 16   IR 25 19           3. Patient will demonstrate 10x bridges to full height to indicate function glut and hamsting activation and strength. Current: Performed 5x bridges with decreased height and poor posterior chain activation     Long Term Goals: To be accomplished in 8 weeks:  1. Patient will improve FOTO score by 11 points to improve functional tolerance for performing ADLs.   Eval Status: Imer 30 score = an established functional score where 100 = no disability        3. Pt will have 5/5 bilat LE strength to return to goals of ambulation and ADLs. Eval Status:                             Hip Left (1-5) Right (1-5)   Hip Flexion 4 4   Hip Extension NT NT   Hip ABD (seated) 4+ 4+   Hip ADD (seated) 4+ 4+   Hip ER 4 4   Hip IR 4- 4      Knee Left (1-5) Right (1-5)   Knee Flexion 4+ 4+   Knee Extension 5 5   Ankle PF 4+ 4+   Ankle DF 5 5   Other       Notes: Trunk compensation with MMT; poor hamstring and glut activation during PPT; cramping in left hamstrings during bridges           4. Patient will improve pain in right hip to 1-2/10 at worst to improve activity tolerance and restore prior level of function. Eval Status: 10/10 at worst        PLAN  []  Upgrade Activities as Tolerated     [x]  Continue Plan of Care  []  Update Interventions per Flow Sheet       []  Discharge Due To:_  []  Other:_      [facility] [employer].  Gideon, PT, DPT, ATR  2/8/2022 9:29 AM    Future Appointments   Date Time Provider [place] Raphael   2/8/2022 10:45 AM [facility]   2/18/2022 10:00 AM Joey Barnes PT [facility]   2/21/2022 10:45 AM Joey Barnes, PT [facility]   2/25/2022 10:00 AM Joey Barnes PT [facility]   2/28/2022 10:45 AM Joey Barnes PT [facility]   3/2/2022 10:45 AM Rebpedro Barnes PT [facility]   3/7/2022 10:45 AM Joey Barnes PT [facility]   3/9/2022 10:45 AM Yoselyn Kimble [facility]   3/14/2022 10:45 AM Joey Barnes PT [facility]   3/16/2022  9:15 AM Gladis Astorga, PT [facility]   3/21/2022  9:15 AM Gladis Astorga, PT [facility]   3/23/2022  9:15 AM Gladis Astorga, PT [facility]

## 2022-02-10 ENCOUNTER — APPOINTMENT (OUTPATIENT)
Dept: PHYSICAL THERAPY | Age: 72
End: 2022-02-10
Payer: MEDICARE

## 2022-02-16 ENCOUNTER — HOSPITAL ENCOUNTER (OUTPATIENT)
Dept: PHYSICAL THERAPY | Age: 72
Discharge: HOME | End: 2022-02-16
Payer: MEDICARE

## 2022-02-16 PROCEDURE — 97112 NEUROMUSCULAR REEDUCATION: CPT

## 2022-02-16 PROCEDURE — 97110 THERAPEUTIC EXERCISES: CPT

## 2022-02-16 PROCEDURE — 97530 THERAPEUTIC ACTIVITIES: CPT

## 2022-02-16 NOTE — PROGRESS NOTES
PT DAILY TREATMENT NOTE    Patient Name: Ifeoma Walters  Date: 2022  : 1950  [x]  Patient  Verified  Payor: Ana Forrest / Plan: [place] & B / Product Type: Medicare /    In Time: 9:58  Out Time: 10:51  Total Treatment Time (min): 53  Total Timed Codes (min): 53  1:1 Treatment Time ([facility] only): 48   Visit #: 3/16    Treatment Dx: Right hip pain [M25.551]    SUBJECTIVE  Pre-Treatment Pain Level (0-10 scale): 4    Any medication changes, allergies to medications, adverse drug reactions, diagnosis change, or new procedure performed?: [x] No    [] Yes (see summary sheet for update)    Subjective Functional Status/Changes:  [] No changes reported  Patient reports some minor soreness after last visit that he states resolved after a couple of days. OBJECTIVE    28 min Therapeutic Exercise:  [x] See flow sheet   Rationale: increase ROM, increase strength, and decrease pain to assist in improving patient's ability to perform functional activities and ADLs    12 min Therapeutic Activity:  [x] See flow sheet   Rationale: increase ROM, increase strength, and improve coordination,  to assist in improving patient's ability to perform functional activities and ADLs    8 min Neuromuscular Re-Education:  [x] See flow sheet   Rationale: improve coordination, improve balance/proprioception, improve posture, and improve core stabilization to assist in improving patient's ability to perform functional activities and ADLs as well as to improve core stabilization during static/dynamic movements      With   [x] TE   [] TA   [] neuro   [] other: Patient Education: [x] Review HEP    [] Progressed/Changed HEP based on:   [] positioning   [] body mechanics   [] transfers   [] heat/ice application    [] other:      Other Objective/Functional Measures: N/A     Pain Level (0-10 scale) Post Treatment: 3    ASSESSMENT/Changes in Function:  Patient responded well to today's treatment without any adverse reactions.   Patient requires cueing throughout exercises as they are still new to him. He also required a few standing rest breaks secondary to mild SOB, that resolved after a few seconds of rest.  He did well with the additions of step ups (forward and lateral), though was slightly more challenged with lateral step ups when leading with the right foot first.  Patient states at home he tends to go up with his left foot first (forwards) secondary to right hip pain. Patient will continue to benefit from skilled PT services to further modify and progress therapeutic interventions, address functional mobility deficits, address ROM deficits, address strength deficits, analyze and address soft tissue restrictions, analyze and cue movement patterns, analyze and modify body mechanics/ergonomics, assess and modify postural abnormalities, and instruct in home and community integration to attain remaining goals. [x]  See Plan of Care  []  See Progress Note/Recertification  []  See Discharge Summary         Progress Towards Goals/Updated Goals:  Short Term Goals: To be accomplished in 4 weeks:  1. Patient will be independent and compliant with HEP to progress toward goals and restore functional mobility. Eval Status: issued at eval  Current: Patient reports daily performance with his HEP, but did require cueing throughout treatment today for proper form during exercises. 2/16/22, in progress     2. Pt will have painfree right hip AROM WFL to aid in functional mechanics for ambulation/ADLs. Eval Status:   Hip Left Right   Flexion 94 80   ER 20 16   IR 25 19           3. Patient will demonstrate 10x bridges to full height to indicate function glut and hamsting activation and strength.               Eval: Performed 5x bridges with decreased height and poor posterior chain activation   Current: Patient able to perform 2x10 of bridges, though with fair glute activation as shown by decreased height during bridges.    2/16/22, in progress     Long Term Goals: To be accomplished in 8 weeks:  1. Patient will improve FOTO score by 11 points to improve functional tolerance for performing ADLs. Eval Status: FOTO 52  FOTO score = an established functional score where 100 = no disability        3.   Pt will have 5/5 bilat LE strength to return to goals of ambulation and ADLs. Eval Status:                             Hip Left (1-5) Right (1-5)   Hip Flexion 4 4   Hip Extension NT NT   Hip ABD (seated) 4+ 4+   Hip ADD (seated) 4+ 4+   Hip ER 4 4   Hip IR 4- 4      Knee Left (1-5) Right (1-5)   Knee Flexion 4+ 4+   Knee Extension 5 5   Ankle PF 4+ 4+   Ankle DF 5 5   Other       Notes: Trunk compensation with MMT; poor hamstring and glut activation during PPT; cramping in left hamstrings during bridges       4.   Patient will improve pain in right hip to 1-2/10 at worst to improve activity tolerance and restore prior level of function. Eval Status: 10/10 at worst  Current: 5-7/10 pain at worst over the last week    2/16/22, in progress        PLAN  []  Upgrade Activities as Tolerated     [x]  Continue Plan of Care  []  Update Interventions per Flow Sheet       []  Discharge Due To:_  []  Other:_      Edel Meneses, PT, DPT, ATR  2/16/2022 8:02 AM    Future Appointments   Date Time Provider Estevan Raphael   [facility]22 10[facility]AN[facility]   2/18/2022 10:00 AM Seadrift Fend, PT MIHPTBW [facility]   2/21/2022 10:45 AM Seadrift Fend, PT [facility]PT[facility]   2/25/2022 10:00 AM Seadrift Fend, PT [facility]   2/28/2022 10:45 AM Seadrift Fend, PT [facility]PT[facility]   3/2/2022 10:45 AM Seadrift Fend, PT MI[facility]PT[facility]   3/7/2022 10:45 AM Seadrift Fend, PT [facility]   3/9/2022 10:45 AM Jesica Kimble [facility]PTBW [facility]   3/14/2022 10:45 AM Emily Brewer, NATALIE MI[facility]PTBW [facility]   3/16/2022  9:15 AM [place] Second, PT [facility]PTBW [facility]   3/21/2022  9:15 AM [place] Second, PT [facility]PTBW [facility]   3/23/2022  9:15 AM [place] Second, PT [facility]PTBW [facility]

## 2022-02-17 ENCOUNTER — APPOINTMENT (OUTPATIENT)
Dept: PHYSICAL THERAPY | Age: 72
End: 2022-02-17
Payer: MEDICARE

## 2022-02-18 ENCOUNTER — HOSPITAL ENCOUNTER (OUTPATIENT)
Dept: PHYSICAL THERAPY | Age: 72
Discharge: HOME | End: 2022-02-18
Payer: MEDICARE

## 2022-02-18 PROCEDURE — 97112 NEUROMUSCULAR REEDUCATION: CPT

## 2022-02-18 PROCEDURE — 97110 THERAPEUTIC EXERCISES: CPT

## 2022-02-18 NOTE — PROGRESS NOTES
PT DAILY TREATMENT NOTE    Patient Name: Pasha Galicia  XCAL:  : 1950  [x]  Patient  Verified  Payor: [place] MEDICARE / Plan: [place] MEDICARE PART A & B / Product Type: Medicare /    In time:10:00am  Out time:10: 48  Total Treatment Time (min): 53  Total Timed Codes (min): 53  1:1 Treatment Time ([facility] only): 48   Visit #: 4 of 16    Treatment Dx: Right hip pain [M25.551]    SUBJECTIVE  Pain Level (0-10 scale): 0  Any medication changes, allergies to medications, adverse drug reactions, diagnosis change, or new procedure performed?: [x] No    [] Yes (see summary sheet for update)  Subjective functional status/changes:   [] No changes reported  The pt was happy to have no pain. 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 OBJECTIVE    29 min Therapeutic Exercise:  [x] See flow sheet :   Rationale: increase ROM and increase strength to improve the patients ability to return to PLOF     24 min Neuromuscular Re-education:  [x]  See flow sheet :   Rationale: increase ROM, increase strength, improve coordination, improve balance and increase proprioception  to improve the patients ability to perform daily activities with decreased pain and symptom levels         With   [] TE   [] TA   [] neuro   [] other: Patient Education: [x] Review HEP    [] Progressed/Changed HEP based on:   [] positioning   [] body mechanics   [] transfers   [] heat/ice application    [] other:      Other Objective/Functional Measures: updated HEP goal      Pain Level (0-10 scale) post treatment: 0    ASSESSMENT/Changes in Function: The pt reported to therapy with a pleasant attitude and ready to participate in therapeutic exercises. Patient tolerated treatment session well today. Patient had no complaints with addition of FABIOLA 90-90 position exercises to exercise program to accomplish lumbo-pelvic stability.  Pt required multiple tactile cues, verbal cues, and demo for proper body mechanics of all new exercises. He was challenged with maintaining the PPT during each exercises. Patient continues to make good progress toward goals and would benefit from continued skilled PT intervention to address remaining deficits outlined in goals below. Patient will continue to benefit from skilled PT services to modify and progress therapeutic interventions, address functional mobility deficits, address ROM deficits, address strength deficits, analyze and address soft tissue restrictions, analyze and cue movement patterns, analyze and modify body mechanics/ergonomics, assess and modify postural abnormalities and address imbalance/dizziness to attain remaining goals. [x]  See Plan of Care  []  See progress note/recertification  []  See Discharge Summary         Progress towards goals / Updated goals:  Short Term Goals: To be accomplished in 4 weeks:  1. Patient will be independent and compliant with HEP to progress toward goals and restore functional mobility. Eval Status: issued at eval  Current: Patient reports daily performance with his HEP, but did require cueing throughout treatment today for proper form during exercises.    2/16/22, in progress  Current 2/18/22: updated and reviewed HEP - pt reported infrequently completing his HEP      2. Pt will have painfree right hip AROM WFL to aid in functional mechanics for ambulation/ADLs. Eval Status:   Hip Left Right   Flexion 94 80   ER 20 16   IR 25 19           3. Patient will demonstrate 10x bridges to full height to indicate function glut and hamsting activation and strength.               Eval: Performed 5x bridges with decreased height and poor posterior chain activation              Current: Patient able to perform 2x10 of bridges, though with fair glute activation as shown by decreased height during bridges. 2/16/22, in progress     Long Term Goals: To be accomplished in 8 weeks:  1.  Patient will improve FOTO score by 11 points to improve functional tolerance for performing ADLs. Eval Status: FOTO 52  FOTO score = an established functional score where 100 = no disability        3.   Pt will have 5/5 bilat LE strength to return to goals of ambulation and ADLs. Eval Status:                             Hip Left (1-5) Right (1-5)   Hip Flexion 4 4   Hip Extension NT NT   Hip ABD (seated) 4+ 4+   Hip ADD (seated) 4+ 4+   Hip ER 4 4   Hip IR 4- 4      Knee Left (1-5) Right (1-5)   Knee Flexion 4+ 4+   Knee Extension 5 5   Ankle PF 4+ 4+   Ankle DF 5 5   Other       Notes: Trunk compensation with MMT; poor hamstring and glut activation during PPT; cramping in left hamstrings during bridges        4.   Patient will improve pain in right hip to 1-2/10 at worst to improve activity tolerance and restore prior level of function.   Eval Status: 10/10 at worst  Current: 5-7/10 pain at worst over the last week    2/16/22, in progress     PLAN  []  Upgrade activities as tolerated     [x]  Continue plan of care  []  Update interventions per flow sheet       []  Discharge due to:_  []  Other:_      Reuben Barrera, PT 2/18/2022  10:32 AM    Future Appointments   Date Time Provider Estevan Raphael   2/25/2022 10:00 AM Azucena Anguiano, PT [facility]   2/28/2022 10:45 AM Sara Sanders, PT [facility]   3/2/2022 10:45 AM Sara Sanders, PT [facility]   3/7/2022 10:45 AM Sara Sanders, PT [facility]   3/9/2022 10:45 AM Remesic, Lennox Adler MI[facility]PTBW [facility]   3/14/2022 10:45 AM Sara Sanders, PT [facility]   3/16/2022  9:15 AM Gerchristopherine Annmarie, PT [facility]PTBW [facility]   3/21/2022  9:15 AM Geroldine Annmarie, PT [facility]PT[facility]   3/23/2022  9:15 AM Azucena [facility]guiano[facility]

## 2022-02-21 ENCOUNTER — HOSPITAL ENCOUNTER (OUTPATIENT)
Dept: PHYSICAL THERAPY | Age: 72
End: 2022-02-21
Payer: MEDICARE

## 2022-02-25 ENCOUNTER — HOSPITAL ENCOUNTER (OUTPATIENT)
Dept: PHYSICAL THERAPY | Age: 72
Discharge: HOME | End: 2022-02-25
Payer: MEDICARE

## 2022-02-25 PROCEDURE — 97530 THERAPEUTIC ACTIVITIES: CPT

## 2022-02-25 PROCEDURE — 97110 THERAPEUTIC EXERCISES: CPT

## 2022-02-25 PROCEDURE — 97112 NEUROMUSCULAR REEDUCATION: CPT

## 2022-02-25 NOTE — PROGRESS NOTES
PT DAILY TREATMENT NOTE    Patient Name: Leandra Leventhal  Date:2022  : 1950  [x]  Patient  Verified  Payor: [place] MEDICARE / Plan: [place] MEDICARE PART A & B / Product Type: Medicare /    In time:9:59  Out time:10:56  Total Treatment Time (min): 57  Total Timed Codes (min): 57  1:1 Treatment Time ([facility] only): 62   Visit #: 5 of 16    Treatment Dx: Right hip pain [M25.551]    SUBJECTIVE  Pain Level (0-10 scale): 2-3/10  Any medication changes, allergies to medications, adverse drug reactions, diagnosis change, or new procedure performed?: [x] No    [] Yes (see summary sheet for update)  Subjective functional status/changes:   [] No changes reported  Pt reports that he feels it a little in the hip but his shoulders are what are bothering him today.      OBJECTIVE    27 min Therapeutic Exercise:  [] See flow sheet :   Rationale: increase ROM, increase strength, improve coordination, improve balance and increase proprioception to improve the patients ability to perform daily activities with decreased pain and symptom levels      15 min Therapeutic Activity:  []  See flow sheet :   Rationale: increase ROM, increase strength, improve coordination, improve balance and increase proprioception  to improve the patients ability to perform daily activities with decreased pain and symptom levels       15 min Neuromuscular Re-education:  []  See flow sheet :   Rationale: increase ROM, increase strength, improve coordination, improve balance and increase proprioception  to improve the patients ability to perform daily activities with decreased pain and symptom levels            With   [x] TE   [x] TA   [x] neuro   [] other: Patient Education: [x] Review HEP    [] Progressed/Changed HEP based on:   [x] positioning   [] body mechanics   [] transfers   [] heat/ice application    [x] other: sleeping position, standing on the left LE more, proper gait including arm swing, stepping with the right LE first when first getting up.      Other Objective/Functional Measures: Pt enters gym in no apparent distress. Pain Level (0-10 scale) post treatment: 0/10    ASSESSMENT/Changes in Function: Patient tolerated therapy session well as there were no adverse reactions today. Performed Stepper because bike was aggravating left knee. Added 90-90 hip lift and right hemibridge and pt required constant cuing with technique. Added left adductor pull back and pt required constant tactile cuing to coordinate keeping PPT, and hip shifting with breathing. Pt noted to have decreased left posterior hip shift. Pt noted to have some hip joint pain with some of the exercises. Pt is progressing with therapy as indicated by pt tolerating increase in exercise repetitions and resistance. Although showing progress patient would benefit from continuation of skilled physical therapy to address the remaining limitations. Patient will continue to benefit from skilled PT services to modify and progress therapeutic interventions, address functional mobility deficits, address ROM deficits, address strength deficits, analyze and address soft tissue restrictions, analyze and cue movement patterns, analyze and modify body mechanics/ergonomics, assess and modify postural abnormalities and instruct in home and community integration to attain remaining goals. [x]  See Plan of Care  []  See progress note/recertification  []  See Discharge Summary         Progress towards goals / Updated goals:  Short Term Goals: To be accomplished in 4 weeks:  1. Patient will be independent and compliant with HEP to progress toward goals and restore functional mobility. [facility] Status: issued at [facility]  Current: Patient reports daily performance with his HEP, but did require cueing throughout treatment today for proper form during exercises.    2/16/22, in progress  Current 2/25/22: provided ADL handout-progressing     2.  Pt will have painfree right hip AROM WFL to aid in functional mechanics for ambulation/ADLs. Eval Status:   Hip Left Right   Flexion 94 80   ER 20 16   IR 25 19           3. Patient will demonstrate 10x bridges to full height to indicate function glut and hamsting activation and strength.               Eval: Performed 5x bridges with decreased height and poor posterior chain activation              Current: Patient able to perform 2x10 of bridges, though with fair glute activation as shown by decreased height during bridges.   2/16/22, in progress     Long Term Goals: To be accomplished in 8 weeks:  1. Patient will improve FOTO score by 11 points to improve functional tolerance for performing ADLs. Eval Status: FOTO 52  Current: 2/25/22: 59  FOTO score = an established functional score where 100 = no disability        3.   Pt will have 5/5 bilat LE strength to return to goals of ambulation and ADLs. Eval Status:                             Hip Left (1-5) Right (1-5)   Hip Flexion 4 4   Hip Extension NT NT   Hip ABD (seated) 4+ 4+   Hip ADD (seated) 4+ 4+   Hip ER 4 4   Hip IR 4- 4      Knee Left (1-5) Right (1-5)   Knee Flexion 4+ 4+   Knee Extension 5 5   Ankle PF 4+ 4+   Ankle DF 5 5   Other       Notes: Trunk compensation with MMT; poor hamstring and glut activation during PPT; cramping in left hamstrings during bridges        4.   Patient will improve pain in right hip to 1-2/10 at worst to improve activity tolerance and restore prior level of function.   Eval Status: 10/10 at worst  Current: 2-3/10 pain this therapy session 2/25/22        PLAN  []  Upgrade activities as tolerated     [x]  Continue plan of care  []  Update interventions per flow sheet       []  Discharge due to:_  []  Other:_      Rl Negro, PT, DPT, [facility] 2/25/2022  9:14 AM    Future Appointments   Date Time Provider Estevan Raphael   2/25/2022 10:00 AM NATALIE Washington [facility]   2/28/2022 10:45 AM NATALIE Todd [facility]   3/2/2022 10:45 AM Perlita Bullard PT [facility]   3/7/2022 10:45 AM Maeev Celestin, PT [facility]   3/9/2022 10:45 AM Gabriel Kimble [facility]   3/14/2022 10:45 AM Maeve Celestin, PT [facility]   3/16/2022  9:15 AM Maikol Katz, PT [facility]   3/21/2022  9:15 AM Maikol Katz, PT [facility]   3/23/2022  9:15 AM Maikol Katz, PT [facility]

## 2022-02-28 ENCOUNTER — HOSPITAL ENCOUNTER (OUTPATIENT)
Dept: PHYSICAL THERAPY | Age: 72
Discharge: HOME | End: 2022-02-28
Payer: MEDICARE

## 2022-02-28 PROCEDURE — 97112 NEUROMUSCULAR REEDUCATION: CPT

## 2022-02-28 PROCEDURE — 97530 THERAPEUTIC ACTIVITIES: CPT

## 2022-02-28 PROCEDURE — 97110 THERAPEUTIC EXERCISES: CPT

## 2022-02-28 NOTE — PROGRESS NOTES
[facility] at the [address], [place], [address] [facility]  Phone: [phone]      Fax:  [phone]    Progress Note  Patient name: Benita Crandall Start of Care: 2022   Referral source: Bandar Dubon MD : 1950   Medical/Treatment Diagnosis: Right hip pain [M25.551] Onset Date:~2 months ago     Prior Hospitalization: see medical history Provider#: [id number]   Medications: Verified on Patient Summary List    Comorbidities: OA, Osteoporosis, Pre-diabetic, Depression, RA, Right RCR 2018, Left RCR , hx of left knee scope   Prior Level of Function: functionally independent, no AD, somewhat active lifestyle, enjoys working in his lawn    Visits from Start of Care: 6    Missed Visits: 3    Progress Towards Goals:   [address] be accomplished in 4 weeks:  1. Patient will be independent and compliant with HEP to progress toward goals and restore functional mobility. Eval Status: issued at [facility]  Current: Patient reports daily performance with his HEP, but did require cueing throughout treatment today for proper form during exercises.    22, in progress  Current 22: provided ADL handout-progressing     2. Pt will have painfree right hip AROM WFL to aid in functional mechanics for ambulation/ADLs.   Eval Status:   Hip Left Right   Flexion 94 80   ER 20 16   IR 25 19      Current 22: progressing   Hip Left Right   Flexion 95 96   ER 24 23   IR 26 25   *Flexion measured in supine with knee flexion, ER/IR measured in sitting with towel under knee*           3. Patient will demonstrate 10x bridges to full height to indicate function glut and hamsting activation and strength.               Eval: Performed 5x bridges with decreased height and poor posterior chain activation              [id number]: Patient able to perform 2x10 of bridges, though with fair glute activation as shown by decreased height during bridges.   22, in progress  Current 2/28/22: pt completed 2x5 with TC and VC for body mechanics; pt reporters slight discomfort and fatigue - progressing      Long Term Goals: To be accomplished in 8 weeks:  1. Patient will improve FOTO score by 11 points to improve functional tolerance for performing ADLs. Eval Status: FOTO 52  Current: 2/25/22: 59  FOTO score = an established functional score where 100 = no disability        3.   Pt will have 5/5 bilat LE strength to return to goals of ambulation and ADLs. Eval Status:                             Hip Left (1-5) Right (1-5)   Hip Flexion 4 4   Hip Extension NT NT   Hip ABD (seated) 4+ 4+   Hip ADD (seated) 4+ 4+   Hip ER 4 4   Hip IR 4- 4      Knee Left (1-5) Right (1-5)   Knee Flexion 4+ 4+   Knee Extension 5 5   Ankle PF 4+ 4+   Ankle DF 5 5   Other       Notes: Trunk compensation with MMT; poor hamstring and glut activation during PPT; cramping in left hamstrings during bridges     Current 2/28/22: progressing                        Hip Left (1-5) Right (1-5)   Hip Flexion 4 4   Hip Extension NT NT   Hip ABD (sidelying) 3+ 3+   Hip ADD (seated) 5 5   Hip ER 4+ 4+   Hip IR 4+ 4+      Knee Left (1-5) Right (1-5)   Knee Flexion 5 5   Knee Extension 5 5   Ankle PF 5 5   Ankle DF 5 5      4.   Patient will improve pain in right hip to 1-2/10 at worst to improve activity tolerance and restore prior level of function. Eval Status: 10/10 at worst  Current: 2-3/10 pain this therapy session 2/25/22  Current 2/28/22: worst pain within the last week = 4-5/10 - progressing      New goals to address in the next 6 weeks:  1. The pt will complete a squat 10 with moderate (or greater) decent and proper body mechanics  to demo an increase in LE strength for stairs and community activities. Current 2/28/22: Squat TRX chair taps x5, poor heel awareness, impaired ability to maintain PPT, right lateral lean, and muscle fatigue     2.  The pt will improve LTR by 2 inches bilaterally to demo an incrase in lumbar mobility required for amb and IADLs. Current 2/28/22: LTR: right = 18.5 inches , left = 19 inches       Key Functional Changes:  Patient is a 69 yo male who [facility] with c/o chronic right anterior hip and groin pain, with most recent exacerbation beginning 2 months prior to IE. Pt reported 60 % improvement of symptoms since the start of therapy; reporting examples of improvement with decreased in pain intensity/ frequency and decrease in pain with sitting for long periods of time. Pt continues to report deficits with continued stiffness in right hip after long term sitting and weakness with stairs/STS transfers. Per updated goals the pt demo an improvement in LE gross strength/mobility and pain that impacts functional activity tolerance. The pt continues to demo deficits in lumbar mobility and LE functional strength influencing continued right hip symptoms.      Patient will continue to benefit from skilled PT services to modify and progress therapeutic interventions, address functional mobility deficits, address ROM deficits, address strength deficits, analyze and address soft tissue restrictions, analyze and cue movement patterns, analyze and modify body mechanics/ergonomics, assess and modify postural abnormalities and address imbalance/dizziness to attain remaining goals.        Updated Goals: to be achieved in 6 weeks:   See progressing and updated goals above   ASSESSMENT/RECOMMENDATIONS:  [x]Continue therapy per initial plan/protocol at a frequency of  2 x per week for 6 weeks  []Continue therapy with the following recommended changes:_____________________      _____________________________________________________________________  []Discontinue therapy progressing towards or have reached established goals  []Discontinue therapy due to lack of appreciable progress towards goals  []Discontinue therapy due to lack of attendance or compliance  []Await Physician's recommendations/decisions regarding therapy  []Other:________________________________________________________________    Thank you for this referral.   Chang Bill, PT 2/28/2022 4:23 PM

## 2022-02-28 NOTE — PROGRESS NOTES
PT DAILY TREATMENT NOTE    Patient Name: Danilo Valverde  Date:2022  : 1950  [x]  Patient  Verified  Payor: [place] MED[place] / Plan: [place] MED[place]ARE PART A & B / Product Type: Medicare /    In time:10;45am   Out time:11:32am   Total Treatment Time (min): 47  Total Timed Codes (min): 39  1:1 Treatment Time (MC/BCBS only): 39 (8 minutes with the tech)   Visit #: 6 of 16    Treatment Dx: Right hip pain [M25.551]    SUBJECTIVE  Pain Level (0-10 scale): 1-2  Any medication changes, allergies to medications, adverse drug reactions, diagnosis change, or new procedure performed?: [x] No    [] Yes (see summary sheet for update)  Subjective functional status/changes:   [] No changes reported  The pt reported feeling well today     OBJECTIVE    20 min Therapeutic Exercise:  [x] See flow sheet :   Rationale: increase ROM and increase strength to improve the patients ability to return to PLOF    11 min Therapeutic Activity:  [x]  See flow sheet :   Rationale: increase ROM, increase strength and improve coordination  to improve the patients ability to perform daily activities with decreased pain and symptom levels     16 min Neuromuscular Re-education:  [x]  See flow sheet :   Rationale: increase ROM, increase strength, improve coordination, improve balance and increase proprioception  to improve the patients ability to perform daily activities with decreased pain and symptom levels          With   [] TE   [] TA   [] neuro   [] other: Patient Education: [x] Review HEP    [] Progressed/Changed HEP based on:   [] positioning   [] body mechanics   [] transfers   [] heat/ice application    [] other:      Other Objective/Functional Measures: updated goals for PN    New goals:  Squat TRX chair taps x5, poor heel awareness, impaired ability to maintain PPT, right lateral lean, and muscle fatigue    LTR: right = 18.5 inches , left = 19 inches      Pain Level (0-10 scale) post treatment: 0    ASSESSMENT/Changes in Function: Patient is a 71 yo male who presents to [facility] with c/o chronic right anterior hip and groin pain, with most recent exacerbation beginning 2 months prior to IE. Pt reported 60 % improvement of symptoms since the start of therapy; reporting examples of improvement with decreased in pain intensity/ frequency and decrease in pain with sitting for long periods of time. Pt continues to report deficits with continued stiffness in right hip after long term sitting and weakness with stairs/STS transfers. Per updated goals the pt demo an improvement in LE gross strength/mobility and pain that impacts functional activity tolerance. The pt continues to demo deficits in lumbar mobility and LE functional strength influencing continued right hip symptoms. Patient will continue to benefit from skilled PT services to modify and progress therapeutic interventions, address functional mobility deficits, address ROM deficits, address strength deficits, analyze and address soft tissue restrictions, analyze and cue movement patterns, analyze and modify body mechanics/ergonomics, assess and modify postural abnormalities and address imbalance/dizziness to attain remaining goals. [x]  See Plan of Care  []  See progress note/recertification  []  See Discharge Summary         Progress towards goals / Updated goals:  Short Term Goals: To be accomplished in 4 weeks:  1. Patient will be independent and compliant with HEP to progress toward goals and restore functional mobility. Eval Status: issued at eval  Current: Patient reports daily performance with his HEP, but did require cueing throughout treatment today for proper form during exercises.    2/16/22, in progress  Current 2/25/22: provided ADL handout-progressing     2. Pt will have painfree right hip AROM WFL to aid in functional mechanics for ambulation/ADLs.   Eval Status:   Hip Left Right   Flexion 94 80   ER 20 16   IR 25 19      Current 2/28/22: progressing   Hip Left Right Flexion 95 96   ER 24 23   IR 26 25   *Flexion measured in supine with knee flexion, ER/IR measured in sitting with towel under knee*          3. Patient will demonstrate 10x bridges to full height to indicate function glut and hamsting activation and strength.               Eval: Performed 5x bridges with decreased height and poor posterior chain activation              Current: Patient able to perform 2x10 of bridges, though with fair glute activation as shown by decreased height during bridges.   2/16/22, in progress  Current 2/28/22: pt completed 2x5 with TC and VC for body mechanics; pt reporters slight discomfort and fatigue - progressing      Long Term Goals: To be accomplished in 8 weeks:  1. Patient will improve FOTO score by 11 points to improve functional tolerance for performing ADLs. Eval Status: FOTO 52  Current: 2/25/22: 59  FOTO score = an established functional score where 100 = no disability        3.   Pt will have 5/5 bilat LE strength to return to goals of ambulation and ADLs. Eval Status:                             Hip Left (1-5) Right (1-5)   Hip Flexion 4 4   Hip Extension NT NT   Hip ABD (seated) 4+ 4+   Hip ADD (seated) 4+ 4+   Hip ER 4 4   Hip IR 4- 4      Knee Left (1-5) Right (1-5)   Knee Flexion 4+ 4+   Knee Extension 5 5   Ankle PF 4+ 4+   Ankle DF 5 5   Other       Notes: Trunk compensation with MMT; poor hamstring and glut activation during PPT; cramping in left hamstrings during bridges    Current 2/28/22: progressing                        Hip Left (1-5) Right (1-5)   Hip Flexion 4 4   Hip Extension NT NT   Hip ABD (sidelying) 3+ 3+   Hip ADD (seated) 5 5   Hip ER 4+ 4+   Hip IR 4+ 4+      Knee Left (1-5) Right (1-5)   Knee Flexion 5 5   Knee Extension 5 5   Ankle PF 5 5   Ankle DF 5 5     4.   Patient will improve pain in right hip to 1-2/10 at worst to improve activity tolerance and restore prior level of function.   Eval Status: 10/10 at worst  Current: 2-3/10 pain this therapy session 2/25/22  Current 2/28/22: worst pain within the last week = 4-5/10 - progressing     New goals to address in the next 6 weeks:  1. The pt will complete a squat 10 with moderate (or greater) decent and proper body mechanics  to demo an increase in LE strength for stairs and community activities. Current 2/28/22: Squat TRX chair taps x5, poor heel awareness, impaired ability to maintain PPT, right lateral lean, and muscle fatigue    2. The pt will improve LTR by 2 inches bilaterally to demo an incrase in lumbar mobility required for amb and IADLs.   Current 2/28/22: LTR: right = 18.5 inches , left = 19 inches     PLAN  []  Upgrade activities as tolerated     [x]  Continue plan of care  []  Update interventions per flow sheet       []  Discharge due to:_  []  Other:_      Debria Cranker, PT 2/28/2022  10:59 AM    Future Appointments   Date Time Provider [place] Raphael   3/2/2022 10:45 AM Moises Goldmann, PT [facility]   3/7/2022 10:45 AM Moises Goldmann, PT [facility]   3/9/2022 10:45 AM Ted Kimble [facility]   3/14/2022 10:45 AM Moises Goldmann, PT [facility]   3/16/2022  9:15 AM Velia Hernandez PT [facility]   3/21/2022  9:15 AM Velia Hernandez PT [facility]   3/23/2022  9:15 AM Velia Hernandez PT [facility]

## 2022-03-02 ENCOUNTER — HOSPITAL ENCOUNTER (OUTPATIENT)
Dept: PHYSICAL THERAPY | Age: 72
Discharge: HOME | End: 2022-03-02
Payer: MEDICARE

## 2022-03-02 PROCEDURE — 97112 NEUROMUSCULAR REEDUCATION: CPT

## 2022-03-02 PROCEDURE — 97110 THERAPEUTIC EXERCISES: CPT

## 2022-03-02 PROCEDURE — 97530 THERAPEUTIC ACTIVITIES: CPT

## 2022-03-02 NOTE — PROGRESS NOTES
PT DAILY TREATMENT NOTE    Patient Name: Pamela Ko  Date: 3/2/2022  : 1950  [x]  Patient  Verified  Payor: Dami Akers / Plan: Felice Lopez RPN / Product Type: Commerical /    In Time: 10:43  Out Time: 11:39  Total Treatment Time (min): 54  Total Timed Codes (min): 54  1:1 Treatment Time ( [address] only): 39   Visit #:     Treatment Dx: Right hip pain [M25.551]    SUBJECTIVE  Pre-Treatment Pain Level (0-10 scale): 4    Any medication changes, allergies to medications, adverse drug reactions, diagnosis change, or new procedure performed?: [x] No    [] Yes (see summary sheet for update)    Subjective Functional Status/Changes:  [] No changes reported  \"Take it easy on me today, I got a whole day of yardwork planned. \"  Patient states he is still having some difficulty with negotiating stairs, stating he can ascend/descent using a step-through pattern but slowly and he oJanne Amin has to think about it sometimes, especially coming down stairs. \"    OBJECTIVE    15 min Therapeutic Exercise:  [x] See flow sheet   Rationale: increase ROM, increase strength, and decrease pain to assist in improving patient's ability to perform functional activities and ADLs    15 min Therapeutic Activity:  [x] See flow sheet   Rationale: increase ROM, increase strength, and improve coordination to assist in improving patient's ability to perform functional activities and ADLs    15 min Neuromuscular Re-Education:  [x] See flow sheet   Rationale: improve coordination, improve balance/proprioception, improve posture, and improve core stabilization to assist in improving patient's ability to perform functional activities and ADLs as well as to improve core stabilization during static/dynamic movements      With   [x] TE   [] TA   [] neuro   [] other: Patient Education: [x] Review HEP    [] Progressed/Changed HEP based on:   [] positioning   [] body mechanics   [] transfers   [] heat/ice application    [] other:      Other Objective/Functional Measures: N/A     Pain Level (0-10 scale) Post Treatment: 0    ASSESSMENT/Changes in Function:  Patient responded well to today's treatment without any adverse reactions. Patient able to progress to an 8\" step during step ups (forward and lateral) today with only mild difficulty noted during eccentric lateral step down. Patient will continue to benefit from skilled PT services to further modify and progress therapeutic interventions, address functional mobility deficits, address ROM deficits, address strength deficits, analyze and address soft tissue restrictions, analyze and cue movement patterns, analyze and modify body mechanics/ergonomics, assess and modify postural abnormalities, and instruct in home and community integration to attain remaining goals. [x]  See Plan of Care  []  See Progress Note/Recertification  []  See Discharge Summary         Progress Towards Goals/Updated Goals:    Short Term Goals: To be accomplished in 4 weeks:  1. Patient will be independent and compliant with HEP to progress toward goals and restore functional mobility. Eval Status: issued at eval  Current: Patient reports daily performance with his HEP.   3/2/22, met     2. Pt will have painfree right hip AROM WFL to aid in functional mechanics for ambulation/ADLs.   Eval Status:   Hip Left Right   Flexion 94 80   ER 20 16   IR 25 19      Current 2/28/22: progressing   Hip Left Right   Flexion 95 96   ER 24 23   IR 26 25   *Flexion measured in supine with knee flexion, ER/IR measured in sitting with towel under knee*           3. Patient will demonstrate 10x bridges to full height to indicate function glut and hamsting activation and strength.               Eval: Performed 5x bridges with decreased height and poor posterior chain activation              Current: Patient able to perform 2x10 of bridges, though with fair glute activation as shown by decreased height during bridges.   2/16/22, in progress  Current 2/28/22: pt completed 2x5 with TC and VC for body mechanics; pt reporters slight discomfort and fatigue - progressing      Long Term Goals: To be accomplished in 8 weeks:  1. Patient will improve FOTO score by 11 points to improve functional tolerance for performing ADLs. Eval Status: FOTO 52  Current: 2/25/22: 59  FOTO score = an established functional score where 100 = no disability        3.   Pt will have 5/5 bilat LE strength to return to goals of ambulation and ADLs. Eval Status:                             Hip Left (1-5) Right (1-5)   Hip Flexion 4 4   Hip Extension NT NT   Hip ABD (seated) 4+ 4+   Hip ADD (seated) 4+ 4+   Hip ER 4 4   Hip IR 4- 4      Knee Left (1-5) Right (1-5)   Knee Flexion 4+ 4+   Knee Extension 5 5   Ankle PF 4+ 4+   Ankle DF 5 5   Other       Notes: Trunk compensation with MMT; poor hamstring and glut activation during PPT; cramping in left hamstrings during bridges     Current 2/28/22: progressing                        Hip Left (1-5) Right (1-5)   Hip Flexion 4 4   Hip Extension NT NT   Hip ABD (sidelying) 3+ 3+   Hip ADD (seated) 5 5   Hip ER 4+ 4+   Hip IR 4+ 4+      Knee Left (1-5) Right (1-5)   Knee Flexion 5 5   Knee Extension 5 5   Ankle PF 5 5   Ankle DF 5 5      4.   Patient will improve pain in right hip to 1-2/10 at worst to improve activity tolerance and restore prior level of function.   Eval Status: 10/10 at worst  Current: 2-3/10 pain this therapy session 2/25/22  Current 2/28/22: worst pain within the last week = 4-5/10 - progressing      New goals to address in the next 6 weeks:  1. The pt will complete a squat 10 with moderate (or greater) decent and proper body mechanics  to demo an increase in LE strength for stairs and community activities.   Alexander Craramiro 2/28/22: Squat TRX chair taps x5, poor heel awareness, impaired ability to maintain PPT, right lateral lean, and muscle fatigue     2. The pt will improve LTR by 2 inches bilaterally to demo an incrase in lumbar mobility required for amb and IADLs. Current 2/28/22: LTR: right = 18.5 inches , left = 19 inches         PLAN  []  Upgrade Activities as Tolerated     [x]  Continue Plan of Care  []  Update Interventions per Flow Sheet       []  Discharge Due To:_  []  Other:_      Parrymarta Patel.  Gideon, PT, DPT, ATR  3/2/2022 8:09 AM    Future Appointments   Date Time Provider Estevan Raphael   3/2/2022 10:45 AM [facility] NATALIE BROWN [facility]   3/7/2022 10:45 AM Genia Noriega PT [facility]   3/9/2022 10:45 AM Lorenzo Kimble [facility]   3/14/2022 10:45 AM Genia Noriega PT [facility]   3/16/2022  9:15 AM Fouzia Mcmahan PT [facility]   3/21/2022  9:15 AM Fouzia Mcmahan PT [facility]   3/23/2022  9:15 AM Fouzia Mcmahan PT [facility]

## 2022-03-07 ENCOUNTER — HOSPITAL ENCOUNTER (OUTPATIENT)
Dept: PHYSICAL THERAPY | Age: 72
Discharge: HOME | End: 2022-03-07
Payer: MEDICARE

## 2022-03-07 PROCEDURE — 97530 THERAPEUTIC ACTIVITIES: CPT

## 2022-03-07 PROCEDURE — 97112 NEUROMUSCULAR REEDUCATION: CPT

## 2022-03-07 PROCEDURE — 97110 THERAPEUTIC EXERCISES: CPT

## 2022-03-07 NOTE — PROGRESS NOTES
PT DAILY TREATMENT NOTE    Patient Name: Maurice Benoit  Date:3/7/2022  : 1950  [x]  Patient  Verified  Payor: [place] MED[place] / Plan: [place] MEDICARE PART A & B / Product Type: Medicare /    In time:10:48am  Out time:11;35pm  Total Treatment Time (min): 47  Total Timed Codes (min): 47  1:1 Treatment Time (MC/BCBS only): 52   Visit #: 8 of 16    Treatment Dx: Right hip pain [M25.551]    SUBJECTIVE  Pain Level (0-10 scale): 4  Any medication changes, allergies to medications, adverse drug reactions, diagnosis change, or new procedure performed?: [x] No    [] Yes (see summary sheet for update)  Subjective functional status/changes:   [] No changes reported  The pt reported that prior to therapy he had a normal MRI and an x-ray that showed hip OA.     Pt reported completing yard work over the last weekend cussing and increase in hip pain and stiffness     OBJECTIVE  21 min Therapeutic Exercise:  [x] See flow sheet :   Rationale: increase ROM, increase strength and improve coordination to improve the patients ability to return to PLOF    9 min Therapeutic Activity:  [x]  See flow sheet : pt education of anatomy and  positive impact on symptoms of FABIOLA exercises with/without balloon    Rationale: increase ROM, increase strength and improve coordination  to improve the patients ability to return to PLOF     17 min Neuromuscular Re-education:  [x]  See flow sheet : Pt required multiple tactile cues, verbal cues, and demo for FABIOLA 90-90 exercises with balloon to complete with proper body mechanics    Rationale: increase ROM, increase strength, improve coordination, improve balance and increase proprioception  to improve the patients ability to perform daily activities with decreased pain and symptom levels          With   [] TE   [] TA   [] neuro   [] other: Patient Education: [x] Review HEP    [] Progressed/Changed HEP based on:   [] positioning   [] body mechanics   [] transfers   [] heat/ice application    [] other: Other Objective/Functional Measures: updated LTR goal      Pain Level (0-10 scale) post treatment: 0    ASSESSMENT/Changes in Function: The pt reported to therapy with a pleasant attitude and ready to participate in therapeutic exercises. Patient tolerated treatment session fairly today. Patient had no complaints, but was challenged with addition of sled push, standing Hip ABD, and FABIOLA 90-90 Dig with balloon to exercise program to accomplish an increase in functional LE and diaphragmatic strength. Patient continues to make slow progress toward goals and would benefit from continued skilled PT intervention to address remaining deficits outlined in goals below. Patient will continue to benefit from skilled PT services to modify and progress therapeutic interventions, address functional mobility deficits, address ROM deficits, address strength deficits, analyze and address soft tissue restrictions, analyze and cue movement patterns, analyze and modify body mechanics/ergonomics, assess and modify postural abnormalities and address imbalance/dizziness to attain remaining goals. [x]  See Plan of Care  []  See progress note/recertification  []  See Discharge Summary         Progress towards goals / Updated goals:  Short Term Goals: To be accomplished in 4 weeks:  1. Patient will be independent and compliant with HEP to progress toward goals and restore functional mobility. Eval Status: issued at eval  Current: Patient reports daily performance with his HEP.   3/2/22, met     2. Pt will have painfree right hip AROM WFL to aid in functional mechanics for ambulation/ADLs.   Eval Status:   Hip Left Right   Flexion 94 80   ER 20 16   IR 25 19      Current 2/28/22: progressing   Hip Left Right   Flexion 95 96   ER 24 23   IR 26 25   *Flexion measured in supine with knee flexion, ER/IR measured in sitting with towel under knee*           3. Patient will demonstrate 10x bridges to full height to indicate function glut and hamsting activation and strength.               Eval: Performed 5x bridges with decreased height and poor posterior chain activation              Current: Patient able to perform 2x10 of bridges, though with fair glute activation as shown by decreased height during bridges.   2/16/22, in progress  Current 2/28/22: pt completed 2x5 with TC and VC for body mechanics; pt reporters slight discomfort and fatigue - progressing      Long Term Goals: To be accomplished in 8 weeks:  1. Patient will improve FOTO score by 11 points to improve functional tolerance for performing ADLs. Eval Status: FOTO 52  Current: 2/25/22: 59  FOTO score = an established functional score where 100 = no disability        3.   Pt will have 5/5 bilat LE strength to return to goals of ambulation and ADLs. Eval Status:                             Hip Left (1-5) Right (1-5)   Hip Flexion 4 4   Hip Extension NT NT   Hip ABD (seated) 4+ 4+   Hip ADD (seated) 4+ 4+   Hip ER 4 4   Hip IR 4- 4      Knee Left (1-5) Right (1-5)   Knee Flexion 4+ 4+   Knee Extension 5 5   Ankle PF 4+ 4+   Ankle DF 5 5   Other       Notes: Trunk compensation with MMT; poor hamstring and glut activation during PPT; cramping in left hamstrings during bridges     Current 2/28/22: progressing                        Hip Left (1-5) Right (1-5)   Hip Flexion 4 4   Hip Extension NT NT   Hip ABD (sidelying) 3+ 3+   Hip ADD (seated) 5 5   Hip ER 4+ 4+   Hip IR 4+ 4+      Knee Left (1-5) Right (1-5)   Knee Flexion 5 5   Knee Extension 5 5   Ankle PF 5 5   Ankle DF 5 5      4.   Patient will improve pain in right hip to 1-2/10 at worst to improve activity tolerance and restore prior level of function.   Eval Status: 10/10 at worst  Current: 2-3/10 pain this therapy session 2/25/22  Current 2/28/22: worst pain within the last week = 4-5/10 - progressing      New goals to address in the next 6 weeks:  1. The pt will complete a squat 10 with moderate (or greater) decent and proper body mechanics  to demo an increase in LE strength for stairs and community activities.   Curry Valadez 2/28/22: Squat TRX chair taps x5, poor heel awareness, impaired ability to maintain PPT, right lateral lean, and muscle fatigue     2. The pt will improve LTR by 2 inches bilaterally to demo an incrase in lumbar mobility required for amb and IADLs.   Current 2/28/22: LTR: right = 18.5 inches , left = 19 inches   Current 3/7/22: LTR: right = 18.5 inches , left = 18.5 inches           PLAN  []  Upgrade activities as tolerated     [x]  Continue plan of care  []  Update interventions per flow sheet       []  Discharge due to:_  []  Other:_      Brianda Felix PT 3/7/2022  11:16 AM    Future Appointments   Date Time Provider Estevan Raphael   3/9/2022 10:45 AM Kelly Kimble [facility]   3/14/2022 10:45 AM NATALIE PascualHPJOSHUA [facility]   3/16/2022  9:15 AM NATALIE Story [facility]   3/21/2022  9:15 AM NATALIE Story [facility]   3/23/2022  9:15 AM NATALIE Story [facility]

## 2022-03-09 ENCOUNTER — HOSPITAL ENCOUNTER (OUTPATIENT)
Dept: PHYSICAL THERAPY | Age: 72
Discharge: HOME | End: 2022-03-09
Payer: MEDICARE

## 2022-03-09 PROCEDURE — 97530 THERAPEUTIC ACTIVITIES: CPT

## 2022-03-09 PROCEDURE — 97110 THERAPEUTIC EXERCISES: CPT

## 2022-03-09 PROCEDURE — 97112 NEUROMUSCULAR REEDUCATION: CPT

## 2022-03-09 NOTE — PROGRESS NOTES
PT DAILY TREATMENT NOTE    Patient Name: Pat Griggs  Date:3/9/2022  : 1950  [x]  Patient  Verified  Payor: [place] MEDICARE / Plan: [place] MEDICARE PART A & B / Product Type: Medicare /    In time:10:48  Out time:11:35  Total Treatment Time (min): 47  Total Timed Codes (min): 47  1:1 Treatment Time (MC/BCBS only): 40   Visit #: 9 of 16    Treatment Dx: Right hip pain [M25.551]    SUBJECTIVE  Pain Level (0-10 scale): 4  Any medication changes, allergies to medications, adverse drug reactions, diagnosis change, or new procedure performed?: [x] No    [] Yes (see summary sheet for update)  Subjective functional status/changes:   [] No changes reported  \"not too bad. \"    OBJECTIVE      20 min Therapeutic Exercise:  [x] See flow sheet :   Rationale: increase ROM and increase strength to improve the patients ability to perform daily activities with decreased pain and symptom levels    10 min Therapeutic Activity:  [x]  See flow sheet :   Rationale: increase strength, improve coordination and increase proprioception  to improve the patients ability to perform daily activities with decreased pain and symptom levels     17 min Neuromuscular Re-education:  [x]  See flow sheet :   Rationale: increase strength, improve coordination, improve balance and increase proprioception  to improve the patients ability to perform daily activities with decreased pain and symptom levels    With   [] TE   [] TA   [] neuro   [] other: Patient Education: [x] Review HEP    [] Progressed/Changed HEP based on:   [] positioning   [] body mechanics   [] transfers   [] heat/ice application    [] other:      Other Objective/Functional Measures:   Increased gastroc compensation noted with right glut max - decreased with increased PPT  Fatigue with 90/90 fely     Pain Level (0-10 scale) post treatment: 1    ASSESSMENT/Changes in Function: Pt tolerated session well with reporting decreased pain post session.  Pt continues to demonstrate decreased functional glut strength with easily fatiguing with exercises. Decreased coordination noted with alternating UE and LE activities. Patient will continue to benefit from skilled PT services to modify and progress therapeutic interventions, address functional mobility deficits, address ROM deficits, address strength deficits, analyze and cue movement patterns, analyze and modify body mechanics/ergonomics, assess and modify postural abnormalities, address imbalance/dizziness and instruct in home and community integration to attain remaining goals. [x]  See Plan of Care  []  See progress note/recertification  []  See Discharge Summary         Progress towards goals / Updated goals:  Short Term Goals: To be accomplished in 4 weeks:  1. Patient will be independent and compliant with HEP to progress toward goals and restore functional mobility. Eval Status: issued at eval  Last PN: 2/25/22: provided ADL handou  Current: Patient reports daily performance with his HEP.   3/2/22, met     2. Pt will have painfree right hip AROM WFL to aid in functional mechanics for ambulation/ADLs. Eval Status:   Hip Left Right   Flexion 94 80   ER 20 16   IR 25 19      Last PN 2/28/22: progressing   Hip Left Right   Flexion 95 96   ER 24 23   IR 26 25   *Flexion measured in supine with knee flexion, ER/IR measured in sitting with towel under knee*     Current:           3. Patient will demonstrate 10x bridges to full height to indicate function glut and hamsting activation and strength.               Eval: Performed 5x bridges with decreased height and poor posterior chain activation             Last PN 2/28/22: pt completed 2x5 with TC and VC for body mechanics; pt reporters slight discomfort and fatigue  Current:      Long Term Goals: To be accomplished in 8 weeks:  1. Patient will improve FOTO score by 11 points to improve functional tolerance for performing ADLs.   Eval Status: FOTO 52  Last PN: 2/25/22: 59  Current:   FOTO score = an established functional score where 100 = no disability        3.   Pt will have 5/5 bilat LE strength to return to goals of ambulation and ADLs. Eval Status:                             Hip Left (1-5) Right (1-5)   Hip Flexion 4 4   Hip Extension NT NT   Hip ABD (seated) 4+ 4+   Hip ADD (seated) 4+ 4+   Hip ER 4 4   Hip IR 4- 4      Knee Left (1-5) Right (1-5)   Knee Flexion 4+ 4+   Knee Extension 5 5   Ankle PF 4+ 4+   Ankle DF 5 5   Other       Notes: Trunk compensation with MMT; poor hamstring and glut activation during PPT; cramping in left hamstrings during bridges     Last PN2/28/22:                     Hip Left (1-5) Right (1-5)   Hip Flexion 4 4   Hip Extension NT NT   Hip ABD (sidelying) 3+ 3+   Hip ADD (seated) 5 5   Hip ER 4+ 4+   Hip IR 4+ 4+      Knee Left (1-5) Right (1-5)   Knee Flexion 5 5   Knee Extension 5 5   Ankle PF 5 5   Ankle DF 5 5      Current: easily fatiguing with 90/90 fely and right glut max progressing 3/9/22    4.   Patient will improve pain in right hip to 1-2/10 at worst to improve activity tolerance and restore prior level of function. Eval Status: 10/10 at worst  Last PN 2/28/22: worst pain within the last week = 4-5/10   Current:      New goals to address in the next 6 weeks:  1. The pt will complete a squat 10 with moderate (or greater) decent and proper body mechanics  to demo an increase in LE strength for stairs and community activities.    Last PN2/28/22: Squat TRX chair taps x5, poor heel awareness, impaired ability to maintain PPT, right lateral lean, and muscle fatigue     2. The pt will improve LTR by 2 inches bilaterally to demo an incrase in lumbar mobility required for amb and IADLs.   Current 2/28/22: LTR: right = 18.5 inches , left = 19 inches   Current 3/7/22: LTR: right = 18.5 inches , left = 18.5 inches        PLAN  []  Upgrade activities as tolerated     [x]  Continue plan of care  []  Update interventions per flow sheet       []  Discharge due to:_  []  Other:_      Neelima Jessica Remesi 3/9/2022  10:52 AM    Future Appointments   Date Time Provider Estevan Raphael   3/14/2022 10:45 AM Chetna Dorado, PT [facility]   3/16/2022  9:15 AM [facility]   3/21/2022  9:15 AM Carol Score, PT [facility]   3/23/2022  9:15 AM Carol Score, PT [facility]

## 2022-03-14 ENCOUNTER — APPOINTMENT (OUTPATIENT)
Dept: PHYSICAL THERAPY | Age: 72
End: 2022-03-14
Payer: MEDICARE

## 2022-03-16 ENCOUNTER — TELEPHONE (OUTPATIENT)
Dept: PHYSICAL THERAPY | Age: 72
End: 2022-03-16

## 2022-03-21 ENCOUNTER — HOSPITAL ENCOUNTER (OUTPATIENT)
Dept: PHYSICAL THERAPY | Age: 72
Discharge: HOME | End: 2022-03-21
Payer: MEDICARE

## 2022-03-21 PROCEDURE — 97112 NEUROMUSCULAR REEDUCATION: CPT

## 2022-03-21 PROCEDURE — 97530 THERAPEUTIC ACTIVITIES: CPT

## 2022-03-21 NOTE — PROGRESS NOTES
PT DAILY TREATMENT NOTE    Patient Name: Caryn Lindsey  Date:3/21/2022  : 1950  [x]  Patient  Verified  Payor: [place] MEDICARE / Plan: [place] MEDICARE PART A & B / Product Type: Medicare /    In time:9:16  Out time:10:17  Total Treatment Time (min): 61  Total Timed Codes (min): 51  1:1 Treatment Time (MC/BCBS only): 23   Visit #: 10 of 16    Treatment Dx: Right hip pain [M25.551]    SUBJECTIVE  Pain Level (0-10 scale): 0/10, stiffness in the right hip  Any medication changes, allergies to medications, adverse drug reactions, diagnosis change, or new procedure performed?: [x] No    [] Yes (see summary sheet for update)  Subjective functional status/changes:   [] No changes reported  Reports that he was lifting bathroom fibroglass and didn't think it was that heavy, but started having increased shoulder and arm. Reports that he normally can't lift over 25#. Reports that it was last  before last and then this Saturday started bothering him again. Reports he is going to call surgeon today. Reports biceps is still swollen. Reports that he missed therapy due to the shoulder pain.      OBJECTIVE    Modalities Rationale:     decrease edema, decrease inflammation and decrease pain to improve patient's ability to perform daily activities with decreased pain and symptom levels     min [] Estim, type/location:                                      []  att     []  unatt     []  w/US     []  w/ice    []  w/heat    min []  Mechanical Traction: type/lbs                   []  pro   []  sup   []  int   []  cont    []  before manual    []  after manual    min []  Ultrasound, settings/location:      min []  Iontophoresis w/ dexamethasone, location:                                               []  take home patch       []  in clinic   10 min [x]  Ice     []  Heat    location/position: Pt sitting in upright position on table, with CP to the left shoulder    min []  Vasopneumatic Device, press/temp:    If using vaso (only need to measure limb vaso being performed on)      pre-treatment girth :       post-treatment girth :       measured at (landmark location) :      min []  Other:    [] Skin assessment post-treatment (if applicable):    []  intact    []  redness- no adverse reaction                  []redness - adverse reaction:             28  NC min Therapeutic Exercise:  [] See flow sheet :   Rationale: increase ROM, increase strength, improve coordination, improve balance and increase proprioception to improve the patients ability to perform daily activities with decreased pain and symptom levels      8 min Therapeutic Activity:  []  See flow sheet :   Rationale: increase ROM, increase strength, improve coordination, improve balance and increase proprioception  to improve the patients ability to perform daily activities with decreased pain and symptom levels       15 min Neuromuscular Re-education:  []  See flow sheet :   Rationale: increase ROM, increase strength, improve coordination, improve balance and increase proprioception  to improve the patients ability to perform daily activities with decreased pain and symptom levels              With   [x] TE   [x] TA   [x] neuro   [] other: Patient Education: [x] Review HEP    [] Progressed/Changed HEP based on:   [x] positioning   [x] body mechanics   [] transfers   [] heat/ice application    [x] other:      Other Objective/Functional Measures: Pt enters gym in apparent distress. TTP: along the left biceps, swelling noted in left biceps, pain with active left elbow flexion, PROM shoulder flexion/scaption limited (pt had a hard time relaxing), Yergeson test negative, +Neer Impingement, +Evangelista-Jonah, -Sulcus sign, +speeds test, decreased AROM, -Drop arm test      Pain Level (0-10 scale) post treatment: 0/10 in the hip    ASSESSMENT/Changes in Function: Patient tolerated therapy session well but with inc in left shoulder pain.  Pt does appear to have bicep involvement and it is recommended that pt seeks medical attention. Pt continues to require tactile and verbal cuing with PPT especially in the sidelying position. Performed right glut max in hooklying position as pt reporting pain with laying on the left side. Pt is progressing with therapy as indicated by pt tolerating increase in exercise repetitions and resistance. Although showing progress patient would benefit from continuation of skilled physical therapy to address the remaining limitations. Patient will continue to benefit from skilled PT services to modify and progress therapeutic interventions, address functional mobility deficits, address ROM deficits, address strength deficits, analyze and address soft tissue restrictions, analyze and cue movement patterns, analyze and modify body mechanics/ergonomics, assess and modify postural abnormalities, address imbalance/dizziness and instruct in home and community integration to attain remaining goals. [x]  See Plan of Care  []  See progress note/recertification  []  See Discharge Summary         Progress towards goals / Updated goals:  Short Term Goals: To be accomplished in 4 weeks:  1. Patient will be independent and compliant with HEP to progress toward goals and restore functional mobility. Eval Status: issued at eval  Last PN: 2/25/22: provided ADL handou  Current: Patient reports daily performance with his HEP.   3/2/22, met     2. Pt will have painfree right hip AROM WFL to aid in functional mechanics for ambulation/ADLs.   Eval Status:   Hip Left Right   Flexion 94 80   ER 20 16   IR 25 19      Last PN 2/28/22: progressing   Hip Left Right   Flexion 95 96   ER 24 23   IR 26 25   *Flexion measured in supine with knee flexion, ER/IR measured in sitting with towel under knee*      Current:           3. Patient will demonstrate 10x bridges to full height to indicate function glut and hamsting activation and strength.               Eval: Performed 5x bridges with decreased height and poor posterior chain activation             Last PN 2/28/22: pt completed 2x5 with TC and VC for body mechanics; pt reporters slight discomfort and fatigue  Current:      Long Term Goals: To be accomplished in 8 weeks:  1. Patient will improve FOTO score by 11 points to improve functional tolerance for performing ADLs. Eval Status: FOTO 46  Last PN: 2/25/22: 59  Current: 3/21/22 will perform on next visit  FOTO score = an established functional score where 100 = no disability        3.   Pt will have 5/5 bilat LE strength to return to goals of ambulation and ADLs. Eval Status:                             Hip Left (1-5) Right (1-5)   Hip Flexion 4 4   Hip Extension NT NT   Hip ABD (seated) 4+ 4+   Hip ADD (seated) 4+ 4+   Hip ER 4 4   Hip IR 4- 4      Knee Left (1-5) Right (1-5)   Knee Flexion 4+ 4+   Knee Extension 5 5   Ankle PF 4+ 4+   Ankle DF 5 5   Other       Notes: Trunk compensation with MMT; poor hamstring and glut activation during PPT; cramping in left hamstrings during bridges     Last PN2/28/22:                     Hip Left (1-5) Right (1-5)   Hip Flexion 4 4   Hip Extension NT NT   Hip ABD (sidelying) 3+ 3+   Hip ADD (seated) 5 5   Hip ER 4+ 4+   Hip IR 4+ 4+      Knee Left (1-5) Right (1-5)   Knee Flexion 5 5   Knee Extension 5 5   Ankle PF 5 5   Ankle DF 5 5      Current: pt continues to be fatigued with 90/90 fely and right glut max progressing 3/21/22     4.   Patient will improve pain in right hip to 1-2/10 at worst to improve activity tolerance and restore prior level of function.   Eval Status: 10/10 at worst  Last PN 2/28/22: worst pain within the last week = 4-5/10   Current: 3/21/22 reports pain 0/10 hip pain, just stiffness     New goals to address in the next 6 weeks:  1. The pt will complete a squat 10 with moderate (or greater) decent and proper body mechanics  to demo an increase in LE strength for stairs and community activities.    Last PN2/28/22: Squat TRX chair taps x5, poor heel awareness, impaired ability to maintain PPT, right lateral lean, and muscle fatigue     2. The pt will improve LTR by 2 inches bilaterally to demo an incrase in lumbar mobility required for amb and IADLs.   Last PN 2/28/22: LTR: right = 18.5 inches , left = 19 inches   Current 3/7/22: LTR: right = 18.5 inches , left = 18.5 inches         PLAN  [x]  Upgrade activities as tolerated     [x]  Continue plan of care  []  Update interventions per flow sheet       []  Discharge due to:_  []  Other:_      Kehinde Orellana, PT, DPT, [facility] 3/21/2022  8:28 AM    Future Appointments   Date Time Provider Estevan Raphael   3/21/2022  9:15 AM NATALIE Pablo [facility]   3/23/2022  9:15 AM NATALIE Pablo [facility]

## 2022-03-23 ENCOUNTER — HOSPITAL ENCOUNTER (OUTPATIENT)
Dept: PHYSICAL THERAPY | Age: 72
Discharge: HOME | End: 2022-03-23
Payer: MEDICARE

## 2022-03-23 PROCEDURE — 97530 THERAPEUTIC ACTIVITIES: CPT

## 2022-03-23 PROCEDURE — 97110 THERAPEUTIC EXERCISES: CPT

## 2022-03-23 PROCEDURE — 97112 NEUROMUSCULAR REEDUCATION: CPT

## 2022-03-23 NOTE — PROGRESS NOTES
PT DAILY TREATMENT NOTE    Patient Name: Candyce Collet  Date:3/23/2022  : 1950  [x]  Patient  Verified  Payor: [place] MED[place] / Plan: [place] MEDICARE PART A & B / Product Type: Medicare /    In time:9:16 Out time:10:13  Total Treatment Time (min): 57  Total Timed Codes (min): 47  1:1 Treatment Time (MC/BCBS only): 38   Visit #: 11 of 16    Treatment Dx: Right hip pain [M25.551]    SUBJECTIVE  Pain Level (0-10 scale): 0/10  Any medication changes, allergies to medications, adverse drug reactions, diagnosis change, or new procedure performed?: [x] No    [] Yes (see summary sheet for update)  Subjective functional status/changes:   [] No changes reported  Reports that he did some yard work yesterday a lot of raking and blowing. Reports that the shoulder is feeling better, almost gone. Reports that he is afraid of getting MRI due to insurance and hassle with that.      OBJECTIVE    Modalities Rationale:     decrease pain and increase tissue extensibility to improve patient's ability to perform daily activities with decreased pain and symptom levels     min [] Estim, type/location:                                      []  att     []  unatt     []  w/US     []  w/ice    []  w/heat    min []  Mechanical Traction: type/lbs                   []  pro   []  sup   []  int   []  cont    []  before manual    []  after manual    min []  Ultrasound, settings/location:      min []  Iontophoresis w/ dexamethasone, location:                                               []  take home patch       []  in clinic   10 min []  Ice     [x]  Heat    location/position: Left shoulder    min []  Vasopneumatic Device, press/temp:    If using vaso (only need to measure limb vaso being performed on)      pre-treatment girth :       post-treatment girth :       measured at (landmark location) :      min []  Other:    [] Skin assessment post-treatment (if applicable):    []  intact    []  redness- no adverse reaction                  []redness - adverse reaction:            12 min Therapeutic Exercise:  [] See flow sheet :   Rationale: increase ROM, increase strength, improve coordination, improve balance and increase proprioception to improve the patients ability to perform daily activities with decreased pain and symptom levels      15 min Therapeutic Activity:  []  See flow sheet :   Rationale: increase ROM, increase strength, improve coordination, improve balance and increase proprioception  to improve the patients ability to perform daily activities with decreased pain and symptom levels       20 min Neuromuscular Re-education:  []  See flow sheet :   Rationale: increase ROM, increase strength, improve coordination, improve balance and increase proprioception  to improve the patients ability to perform daily activities with decreased pain and symptom levels            With   [x] TE   [x] TA   [x] neuro   [] other: Patient Education: [x] Review HEP    [] Progressed/Changed HEP based on:   [x] positioning   [] body mechanics   [] transfers   [] heat/ice application    [x] other: seeking medical attention in reference to the left shoulder     Other Objective/Functional Measures: Pt enters gym in no apparent distress. Pain Level (0-10 scale) post treatment: 0/10    ASSESSMENT/Changes in Function: Patient tolerated therapy session well as there were no adverse reactions today. Pt required constant verbal and tactile cuing on proper form with techniques. Pt is progressing with therapy as indicated by pt tolerating increase in exercise repetitions and resistance. Although showing progress patient would benefit from continuation of skilled physical therapy to address the remaining limitations.        Patient will continue to benefit from skilled PT services to modify and progress therapeutic interventions, address functional mobility deficits, address ROM deficits, address strength deficits, analyze and address soft tissue restrictions, analyze and cue movement patterns, analyze and modify body mechanics/ergonomics, assess and modify postural abnormalities, address imbalance/dizziness and instruct in home and community integration to attain remaining goals. [x]  See Plan of Care  []  See progress note/recertification  []  See Discharge Summary         Progress towards goals / Updated goals:  Short Term Goals: To be accomplished in 4 weeks:  1. Patient will be independent and compliant with HEP to progress toward goals and restore functional mobility. Eval Status: issued at eval  Last PN: 2/25/22: provided ADL handou  Current: Patient reports daily performance with his HEP.   3/2/22, met     2. Pt will have painfree right hip AROM WFL to aid in functional mechanics for ambulation/ADLs. Eval Status:   Hip Left Right   Flexion 94 80   ER 20 16   IR 25 19      Last PN 2/28/22: progressing   Hip Left Right   Flexion 95 96   ER 24 23   IR 26 25   *Flexion measured in supine with knee flexion, ER/IR measured in sitting with towel under knee*    Current:           3. Patient will demonstrate 10x bridges to full height to indicate function glut and hamsting activation and strength.               Eval: Performed 5x bridges with decreased height and poor posterior chain activation             Last PN 2/28/22: pt completed 2x5 with TC and VC for body mechanics; pt reporters slight discomfort and fatigue  Current:      Long Term Goals: To be accomplished in 8 weeks:  1. Patient will improve FOTO score by 11 points to improve functional tolerance for performing ADLs. Eval Status: FOTO 46  Last PN: 2/25/22: 59  Current: 3/23/22 62-progressing  FOTO score = an established functional score where 100 = no disability        3.   Pt will have 5/5 bilat LE strength to return to goals of ambulation and ADLs.   Eval Status:                             Hip Left (1-5) Right (1-5)   Hip Flexion 4 4   Hip Extension NT NT   Hip ABD (seated) 4+ 4+   Hip ADD (seated) 4+ 4+   Hip ER 4 4 Hip IR 4- 4      Knee Left (1-5) Right (1-5)   Knee Flexion 4+ 4+   Knee Extension 5 5   Ankle PF 4+ 4+   Ankle DF 5 5   Other       Notes: Trunk compensation with MMT; poor hamstring and glut activation during PPT; cramping in left hamstrings during bridges     Last PN2/28/22:                     Hip Left (1-5) Right (1-5)   Hip Flexion 4 4   Hip Extension NT NT   Hip ABD (sidelying) 3+ 3+   Hip ADD (seated) 5 5   Hip ER 4+ 4+   Hip IR 4+ 4+      Knee Left (1-5) Right (1-5)   Knee Flexion 5 5   Knee Extension 5 5   Ankle PF 5 5   Ankle DF 5 5      Current: pt continues to be fatigued with 90/90 fely and right glut max progressing 3/21/22     4.   Patient will improve pain in right hip to 1-2/10 at worst to improve activity tolerance and restore prior level of function. Eval Status: 10/10 at worst  Last PN 2/28/22: worst pain within the last week = 4-5/10   Current: 3/23/22 reports pain 0/10 hip pain     New goals to address in the next 6 weeks:  1. The pt will complete a squat 10 with moderate (or greater) decent and proper body mechanics  to demo an increase in LE strength for stairs and community activities.    Last PN2/28/22: Squat TRX chair taps x5, poor heel awareness, impaired ability to maintain PPT, right lateral lean, and muscle fatigue   Current:    2. The pt will improve LTR by 2 inches bilaterally to demo an incrase in lumbar mobility required for amb and IADLs.   Last PN 2/28/22: LTR: right = 18.5 inches , left = 19 inches   Current 3/7/22: LTR: right = 18.5 inches , left = 18.5 inches         PLAN  []  Upgrade activities as tolerated     [x]  Continue plan of care  []  Update interventions per flow sheet       []  Discharge due to:_  []  Other:_      Gilma Meng PT 3/23/2022  8:36 AM    Future Appointments   Date Time Provider Estevan Raphael   3/23/2022  9:15 AM Vernon Kim, PT [facility]

## 2022-04-11 ENCOUNTER — HOSPITAL ENCOUNTER (OUTPATIENT)
Dept: PHYSICAL THERAPY | Age: 72
Discharge: HOME | End: 2022-04-11
Payer: MEDICARE

## 2022-04-11 PROCEDURE — 97530 THERAPEUTIC ACTIVITIES: CPT

## 2022-04-11 PROCEDURE — 97110 THERAPEUTIC EXERCISES: CPT

## 2022-04-11 PROCEDURE — 97112 NEUROMUSCULAR REEDUCATION: CPT

## 2022-04-11 NOTE — PROGRESS NOTES
PT DAILY TREATMENT NOTE    Patient Name: Rosina Alcantar  Date:2022  : 1950  [x]  Patient  Verified  Payor: [place] MEDICARE / Plan: [place] MEDICARE PART A & B / Product Type: Medicare /    In time:12:45  Out time:1:38  Total Treatment Time (min): 53  Total Timed Codes (min): 53  1:1 Treatment Time (MC/BCBS only): 48   Visit #: 12 of 16    Treatment Dx: Right hip pain [M25.551]    SUBJECTIVE  Pain Level (0-10 scale): 0/10  Any medication changes, allergies to medications, adverse drug reactions, diagnosis change, or new procedure performed?: [x] No    [] Yes (see summary sheet for update)  Subjective functional status/changes:   [] No changes reported  Pt reports that worst pain in the last 48 hrs, 4/10. Reports that's he gets stiff sitting in the recliner chair. Reports that he has done some of the HEP. Reports that he is out with the . Reports that he doesn't have any problems with standing. Reports that squatting is still difficult and takes him a long time to get down. Reports that he is in less pain and stiffness since starting therapy. Reports he feels that he needs to continue with therapy.      OBJECTIVE      13 min Therapeutic Exercise:  [] See flow sheet :   Rationale: increase ROM, increase strength, improve coordination, improve balance and increase proprioception to improve the patients ability to perform daily activities with decreased pain and symptom levels      15 min Therapeutic Activity:  []  See flow sheet :   Rationale: increase ROM, increase strength, improve coordination, improve balance and increase proprioception  to improve the patients ability to perform daily activities with decreased pain and symptom levels       25 min Neuromuscular Re-education:  []  See flow sheet :   Rationale: increase ROM, increase strength, improve coordination, improve balance and increase proprioception  to improve the patients ability to perform daily activities with decreased pain and symptom levels        With   [x] TE   [x] TA   [x] neuro   [] other: Patient Education: [x] Review HEP    [] Progressed/Changed HEP based on:   [x] positioning   [x] body mechanics   [] transfers   [] heat/ice application    [x] other: proper sleeping positioning with pillows     Other Objective/Functional Measures: Pt enters gym in no apparent distress. Pt standing with weight shifted to the right, left toe out  Hip  PROM Left Right   Flexion 78 75   ER 25 30   IR 30 25   *Flexion measured in supine with knee in extension, ER/IR measured in sitting with towel under knee*     Bridge: NA    Hip Left (1-5) Right (1-5)   Hip Flexion 4 4+   Hip Extension  Glut max 3-  3- 3-  3-   Hip ABD (sidelying with pelvic neutral, and no hip flexion compensation ) 3- 3-   Hip ADD (seated) 5 5   Hip ER 4+ 5-   Hip IR 4+ 4+        Squat:  Increased lumbar lordosis, increased lumbar flexion, decreased knee flexion    LTR:  LTR: right = 17 inches , left = 17.5 inches  Adductor drop test: right: pos left: pos    Lumbar flexion:  11 inches fingertips to the ground, no reversal of lumbar lordosis    Pain Level (0-10 scale) post treatment: 0/10    ASSESSMENT/Changes in Function:  Patient is a 69 yo male who presents to [facility] with c/o chronic right anterior hip and groin pain, with most recent exacerbation beginning 2 months prior to IE. This is patients 12th visit including evaluation on 2/2/22 and 6th visit since last progress note on 2/28/22. Therapist performing updated PN at earliest opportunity. Pt has progress with therapy as pt reporting dec in pain, rating worst pain 4/10 in the last 48 hrs. Pt stating he is able to stand to perform ADLs and has been able to perform some yard work. Pt also with inc in certain hip ROM, inc in certain LE MMT, and inc in lower trunk rotation. Pt continues to have impairments with squatting techniques and dec LE MMT and LE ROM. Pt also with dec in lumbar ROM.  Pt with a positive adduction drop test and has difficult with performing PPT which can indicate lumbo-pelvic femoral dysfunction. Pt requires max assistance with sidelying pelvic correction techniques. Pt would benefit from continuing with skilled PT to further address impairments. Patient will continue to benefit from skilled PT services to modify and progress therapeutic interventions, address functional mobility deficits, address ROM deficits, address strength deficits, analyze and address soft tissue restrictions, analyze and cue movement patterns, analyze and modify body mechanics/ergonomics, assess and modify postural abnormalities and instruct in home and community integration to attain remaining goals. []  See Plan of Care  [x]  See progress note/recertification  []  See Discharge Summary         Progress towards goals / Updated goals:  Short Term Goals: To be accomplished in 4 weeks:  1. Patient will be independent and compliant with HEP to progress toward goals and restore functional mobility. Eval Status: issued at eval  Last PN: 2/25/22: provided ADL handou  Current: Patient reports daily performance with his HEP.   3/2/22, met     2. Pt will have painfree right hip AROM WFL to aid in functional mechanics for ambulation/ADLs. Eval Status:   Hip Left Right   Flexion 94 80   ER 20 16   IR 25 19      Last PN 2/28/22: progressing   Hip Left Right   Flexion 95 96   ER 24 23   IR 26 25   *Flexion measured in supine with knee flexion, ER/IR measured in sitting with towel under knee*    Current: 4/11/2215-[id number] to be met in 4 more weeks.    Hip  PROM Left Right   Flexion 78 75   ER 25 30   IR 30 25   *Flexion measured in supine with knee in extension, ER/IR measured in sitting with towel under knee*           3. Patient will demonstrate 10x bridges to full height to indicate function glut and hamsting activation and strength.               Eval: Performed 5x bridges with decreased height and poor posterior chain activation             Last PN 2/28/22: pt completed 2x5 with TC and VC for body mechanics; pt reporters slight discomfort and fatigue  Current: 4/11/22 not formally assessed-goal to be met in 4 more weeks     Long Term Goals: To be accomplished in 8 weeks:  1. Patient will improve FOTO score by 11 points to improve functional tolerance for performing ADLs. Eval Status: FOTO 46  Last PN: 2/25/22: 59  Current: 4/11/22: performed on 3/23/22 [id number] to be met in 4 more weeks  FOTO score = an established functional score where 100 = no disability        3.   Pt will have 5/5 bilat LE strength to return to goals of ambulation and ADLs. Eval Status:                             Hip Left (1-5) Right (1-5)   Hip Flexion 4 4   Hip Extension NT NT   Hip ABD (seated) 4+ 4+   Hip ADD (seated) 4+ 4+   Hip ER 4 4   Hip IR 4- 4      Knee Left (1-5) Right (1-5)   Knee Flexion 4+ 4+   Knee Extension 5 5   Ankle PF 4+ 4+   Ankle DF 5 5   Other       Notes: Trunk compensation with MMT; poor hamstring and glut activation during PPT; cramping in left hamstrings during bridges     Last PN2/28/22:                     Hip Left (1-5) Right (1-5)   Hip Flexion 4 4   Hip Extension NT NT   Hip ABD (sidelying) 3+ 3+   Hip ADD (seated) 5 5   Hip ER 4+ 4+   Hip IR 4+ 4+      Knee Left (1-5) Right (1-5)   Knee Flexion 5 5   Knee Extension 5 5   Ankle PF 5 5   Ankle DF 5 5      Current: 4/11/22 -progressing-goal to be met in 4 more weeks  Hip Left (1-5) Right (1-5)   Hip Flexion 4 4+   Hip Extension  Glut max 3-  3- 3-  3-   Hip ABD (sidelying with pelvic neutral, and no hip flexion compensation ) 3- 3-   Hip ADD (seated) 5 5   Hip ER 4+ 5-   Hip IR 4+ 4+        4.   Patient will improve pain in right hip to 1-2/10 at worst to improve activity tolerance and restore prior level of function.   Eval Status: 10/10 at worst  Last PN 2/28/22: worst pain within the last week = 4-5/10   Current: 4/11/22 Pt reports worst pain in the last 48 hrs 4/10-progressing-goal to be met in 4 more weeks     New goals to address in the next 6 weeks:  1. The pt will complete a squat 10 with moderate (or greater) decent and proper body mechanics  to demo an increase in LE strength for stairs and community activities.    Last PN2/28/22: Squat TRX chair taps x5, poor heel awareness, impaired ability to maintain PPT, right lateral lean, and muscle fatigue   Current: 4/11/22 Increased lumbar lordosis, decreased PPT, increased lumbar flexion, decreased knee flexion-goal to be met in 4 more weeks     2. The pt will improve LTR by 2 inches bilaterally to demo an incrase in lumbar mobility required for amb and IADLs. Last PN 2/28/22: LTR: right = 18.5 inches , left = 19 inches   Current 4/11/22 LTR:  LTR: right = 17 inches , left = 17.5 [id number] to be met in 4 more weeks    New goal: 4/11/22- to be met in 4 more weeks  1) Pt will have 2 inch improvement in lumbar flexion so that pt can pick objects off the ground.   Status at PN 4/11/22 Lumbar flexion:  11 inches fingertips to the ground, no reversal of lumbar lordosis               PLAN  []  Upgrade activities as tolerated     []  Continue plan of care  []  Update interventions per flow sheet       []  Discharge due to:_  [x]  Other:_  Continue with skilled PT 2x/week for 4 more weeks    Prisca Tobar, PT, DPT, [facility] 4/11/2022  8:28 AM    Future Appointments   Date Time Provider Estevan Raphael   4/11/2022 12:45 PM Melo Lopez, PT [facility]   4/15/2022 12:15 PM Raynald Plate, PT [facility]   4/18/2022 10:45 AM Rayelíasd Plate, PT [facility]   4/20/2022 10:45 AM Melo Lopez, PT [facility]   4/25/2022  9:15 AM Rayelíasd Plate, PT [facility]   4/28/2022  8:30 AM Guicho Francois PT [facility]

## 2022-04-11 NOTE — PROGRESS NOTES
[facility] at the [address], [place], [address] [facility]  Phone: [phone]      Fax:  [phone]    Progress Note  Patient name: Joan Mccoy Start of Care: 2022   Referral source: Stevenson Regan MD : 1950   Medical/Treatment Diagnosis: Right hip pain [M25.551] Onset Date:~2 months ago      Prior Hospitalization: see medical history Provider#: [id number]   Medications: Verified on Patient Summary List     Comorbidities: OA, Osteoporosis, Pre-diabetic, Depression, RA, Right RCR 2018, Left RCR , hx of left knee scope   Prior Level of Function: functionally independent, no AD, somewhat active lifestyle, enjoys working in his lawn      Visits from Start of Care: 12    Missed Visits: 5    Progress Towards Goals:   [address] be accomplished in 4 weeks:  1. Patient will be independent and compliant with HEP to progress toward goals and restore functional mobility. Eval Status: issued at eval  Last PN: 22: provided ADL handou  Current: Patient reports daily performance with his HEP.   3/2/22, met     2. Pt will have painfree right hip AROM WFL to aid in functional mechanics for ambulation/ADLs. Eval Status:   Hip Left Right   Flexion 94 80   ER 20 16   IR 25 19      Last PN 22: progressing   Hip Left Right   Flexion 95 96   ER 24 23   IR 26 25   *Flexion measured in supine with knee flexion, ER/IR measured in sitting with towel under knee*    Current: 2226-rmjvtigucpu-cnnx to be met in 4 more weeks.    Hip  PROM Left Right   Flexion 78 75   ER 25 30   IR 30 25   *Flexion measured in supine with knee in extension, ER/IR measured in sitting with towel under knee*           3. Patient will demonstrate 10x bridges to full height to indicate function glut and hamsting activation and strength.               Eval: Performed 5x bridges with decreased height and poor posterior chain activation             Last PN 22: pt completed 2x5 with TC and VC for body mechanics; pt reporters slight discomfort and fatigue  Current: 4/11/22 not formally assessed-goal to be met in 4 more weeks     Long Term Goals: To be accomplished in 8 weeks:  1. Patient will improve FOTO score by 11 points to improve functional tolerance for performing ADLs. Eval Status: FOTO 46  Last PN: 2/25/22: 59  Current: 4/11/22: performed on 3/23/22 16-psexzwcmqkc-gxrd to be met in 4 more weeks  FOTO score = an established functional score where 100 = no disability        3.   Pt will have 5/5 bilat LE strength to return to goals of ambulation and ADLs. Eval Status:                             Hip Left (1-5) Right (1-5)   Hip Flexion 4 4   Hip Extension NT NT   Hip ABD (seated) 4+ 4+   Hip ADD (seated) 4+ 4+   Hip ER 4 4   Hip IR 4- 4      Knee Left (1-5) Right (1-5)   Knee Flexion 4+ 4+   Knee Extension 5 5   Ankle PF 4+ 4+   Ankle DF 5 5   Other       Notes: Trunk compensation with MMT; poor hamstring and glut activation during PPT; cramping in left hamstrings during bridges     Last PN2/28/22:                     Hip Left (1-5) Right (1-5)   Hip Flexion 4 4   Hip Extension NT NT   Hip ABD (sidelying) 3+ 3+   Hip ADD (seated) 5 5   Hip ER 4+ 4+   Hip IR 4+ 4+      Knee Left (1-5) Right (1-5)   Knee Flexion 5 5   Knee Extension 5 5   Ankle PF 5 5   Ankle DF 5 5      Current: 4/11/22 -progressing-goal to be met in 4 more weeks  Hip Left (1-5) Right (1-5)   Hip Flexion 4 4+   Hip Extension  Glut max 3-  3- 3-  3-   Hip ABD (sidelying with pelvic neutral, and no hip flexion compensation ) 3- 3-   Hip ADD (seated) 5 5   Hip ER 4+ 5-   Hip IR 4+ 4+         4.   Patient will improve pain in right hip to 1-2/10 at worst to improve activity tolerance and restore prior level of function.   Eval Status: 10/10 at worst  Last PN 2/28/22: worst pain within the last week = 4-5/10   Current: 4/11/22 Pt reports worst pain in the last 48 hrs 4/10-progressing-goal to be met in 4 more weeks     New goals to address in the next 6 weeks:  1. The pt will complete a squat 10 with moderate (or greater) decent and proper body mechanics  to demo an increase in LE strength for stairs and community activities.    Last PN2/28/22: Squat TRX chair taps x5, poor heel awareness, impaired ability to maintain PPT, right lateral lean, and muscle fatigue   Current: 4/11/22 Increased lumbar lordosis, decreased PPT, increased lumbar flexion, decreased knee flexion-goal to be met in 4 more weeks     2. The pt will improve LTR by 2 inches bilaterally to demo an incrase in lumbar mobility required for amb and IADLs. Last PN 2/28/22: LTR: right = 18.5 inches , left = 19 inches   Current 4/11/22 LTR:  LTR: right = 17 inches , left = 17.5 geucub-tjkawogqill-qobr to be met in 4 more weeks     New goal: 4/11/22- to be met in 4 more weeks  1) Pt will have 2 inch improvement in lumbar flexion so that pt can pick objects off the ground. Status at PN 4/11/22 Lumbar flexion:  11 inches fingertips to the ground, no reversal of lumbar lordosis           Key Functional Changes: Patient is a 71 yo male who [facility] to [facility] with c/o chronic right anterior hip and groin pain, with most recent exacerbation beginning 2 months prior to IE. This is patients 12th visit including evaluation on 2/2/22 and 6th visit since last progress note on 2/28/22. Therapist performing updated PN at earliest opportunity. Pt has progress with therapy as pt reporting dec in pain, rating worst pain 4/10 in the last 48 hrs. Pt stating he is able to stand to perform ADLs and has been able to perform some yard work. Pt also with inc in certain hip ROM, inc in certain LE MMT, and inc in lower trunk rotation. Pt continues to have impairments with squatting techniques and dec LE MMT and LE ROM. Pt also with dec in lumbar ROM. Pt with a positive adduction drop test and has difficult with performing PPT which can indicate lumbo-pelvic femoral dysfunction. Pt requires max assistance with sidelying pelvic correction techniques. Pt would benefit from continuing with skilled PT to further address impairments.         Patient will continue to benefit from skilled PT services to modify and progress therapeutic interventions, address functional mobility deficits, address ROM deficits, address strength deficits, analyze and address soft tissue restrictions, analyze and cue movement patterns, analyze and modify body mechanics/ergonomics, assess and modify postural abnormalities and instruct in home and community integration to attain remaining goals.   Updated Goals: to be achieved in 4 more weeks:   Please see above  ASSESSMENT/RECOMMENDATIONS:  []Continue therapy per initial plan/protocol at a frequency of  2 x per week for 4 more weeks  []Continue therapy with the following recommended changes:_____________________      _____________________________________________________________________  []Discontinue therapy progressing towards or have reached established goals  []Discontinue therapy due to lack of appreciable progress towards goals  []Discontinue therapy due to lack of attendance or compliance  []Await Physician's recommendations/decisions regarding therapy  []Other:________________________________________________________________    Thank you for this referral.   Gee Garcia, PT, DPT, CIMT 4/11/2022 8:34 AM

## 2022-04-15 ENCOUNTER — TELEPHONE (OUTPATIENT)
Dept: PHYSICAL THERAPY | Age: 72
End: 2022-04-15

## 2022-04-15 ENCOUNTER — APPOINTMENT (OUTPATIENT)
Dept: PHYSICAL THERAPY | Age: 72
End: 2022-04-15
Payer: MEDICARE

## 2022-04-18 ENCOUNTER — HOSPITAL ENCOUNTER (OUTPATIENT)
Dept: PHYSICAL THERAPY | Age: 72
Discharge: HOME | End: 2022-04-18
Payer: MEDICARE

## 2022-04-18 PROCEDURE — 97112 NEUROMUSCULAR REEDUCATION: CPT

## 2022-04-18 PROCEDURE — 97110 THERAPEUTIC EXERCISES: CPT

## 2022-04-18 NOTE — PROGRESS NOTES
PT DAILY TREATMENT NOTE    Patient Name: Marie Lomeli  RMSZ:[id number]  : 1950  [x]  Patient  Verified  [place]or: [place] MEDICARE / Plan: [place] MEDICARE PART A & B / Product Type: Medicare /    In time:10:54am  Out time:11:38pm  Total Treatment Time (min): 44  Total Timed Codes (min): 39  1:1 Treatment Time (MC/BCBS only): 39   Visit #: 13 of 20    Treatment Dx: Right hip pain [M25.551]    SUBJECTIVE  Pain Level (0-10 scale): 0  Any medication changes, allergies to medications, adverse drug reactions, diagnosis change, or new procedure performed?: [x] No    [] Yes (see summary sheet for update)  Subjective functional status/changes:   [] No changes reported  The pt reported stated that he thought is appointment was later when the pt therapist questioned his tardiness. The pt apologized for missing last week. He stated that he had a 24 hour bug. OBJECTIVE      27 min Therapeutic Exercise:  [x] See flow sheet :   Rationale: increase ROM, increase strength and improve coordination to improve the patients ability to return to PLOF       17 min Neuromuscular Re-education:  [x]  See flow sheet :   Rationale: increase ROM, increase strength, improve coordination, improve balance and increase proprioception  to improve the patients ability to perform daily activities with decreased pain and symptom levels    With   [] TE   [] TA   [] neuro   [] other: Patient Education: [x] Review HEP    [] Progressed/Changed HEP based on:   [] positioning   [] body mechanics   [] transfers   [] heat/ice application    [] other:      Other Objective/Functional Measures: updated bridge goal      Pain Level (0-10 scale) post treatment: 0    ASSESSMENT/Changes in Function: The pt reported to therapy with a pleasant attitude and ready to participate in therapeutic exercises. Patient tolerated treatment session well today.  Patient had no complaints with addition of LTR and s/l clam to exercise program to accomplish an increase in trunk mobility and hip ABD strength. Patient continues to make good progress toward goals and would benefit from continued skilled PT intervention to address remaining deficits outlined in goals below. Patient will continue to benefit from skilled PT services to modify and progress therapeutic interventions, address functional mobility deficits, address ROM deficits, address strength deficits, analyze and address soft tissue restrictions, analyze and cue movement patterns, analyze and modify body mechanics/ergonomics, assess and modify postural abnormalities and address imbalance/dizziness to attain remaining goals. [x]  See Plan of Care  []  See progress note/recertification  []  See Discharge Summary         Progress towards goals / Updated goals:  Short Term Goals: To be accomplished in 4 weeks:  1. Patient will be independent and compliant with HEP to progress toward goals and restore functional mobility. Eval Status: issued at eval  PN: 2/25/22: provided ADL handou  Last PN: Patient reports daily performance with his HEP.   3/2/22, met     2. Pt will have painfree right hip AROM WFL to aid in functional mechanics for ambulation/ADLs.   Eval Status:   Hip Left Right   Flexion 94 80   ER 20 16   IR 25 19      PN 2/28/22: progressing   Hip Left Right   Flexion 95 96   ER 24 23   IR 26 25   *Flexion measured in supine with knee flexion, ER/IR measured in sitting with towel under knee*   Last PN: 4/11/2286-ziindbtzoun-uuym to be met in 4 more weeks.   Hip  PROM Left Right   Flexion 78 75   ER 25 30   IR 30 25   *Flexion measured in supine with knee in extension, ER/IR measured in sitting with towel under knee*           3. Patient will demonstrate 10x bridges to full height to indicate function glut and hamsting activation and strength.               Eval: Performed 5x bridges with decreased height and poor posterior chain activation             PN 2/28/22: pt completed 2x5 with TC and VC for body mechanics; pt reporters slight discomfort and fatigue  Current 4/18/22: pt completed 4x5 bridges with TC/VC for body mechanics; Moderate elevation and reports of fatigue post activity      Long Term Goals: To be accomplished in 8 weeks:  1. Patient will improve FOTO score by 11 points to improve functional tolerance for performing ADLs. Eval Status: FOTO 52  PN: 2/25/22: 59  Last PN: 4/11/22: performed on 3/23/22 08-xgtexsjeyje-aapj to be met in 4 more weeks  FOTO score = an established functional score where 100 = no disability        3.   Pt will have 5/5 bilat LE strength to return to goals of ambulation and ADLs. Eval Status:                             Hip Left (1-5) Right (1-5)   Hip Flexion 4 4   Hip Extension NT NT   Hip ABD (seated) 4+ 4+   Hip ADD (seated) 4+ 4+   Hip ER 4 4   Hip IR 4- 4      Knee Left (1-5) Right (1-5)   Knee Flexion 4+ 4+   Knee Extension 5 5   Ankle PF 4+ 4+   Ankle DF 5 5   Other       Notes: Trunk compensation with MMT; poor hamstring and glut activation during PPT; cramping in left hamstrings during bridges     PN2/28/22:                     Hip Left (1-5) Right (1-5)   Hip Flexion 4 4   Hip Extension NT NT   Hip ABD (sidelying) 3+ 3+   Hip ADD (seated) 5 5   Hip ER 4+ 4+   Hip IR 4+ 4+      Knee Left (1-5) Right (1-5)   Knee Flexion 5 5   Knee Extension 5 5   Ankle PF 5 5   Ankle DF 5 5      Last PN: 4/11/22 -progressing-goal to be met in 4 more weeks  Hip Left (1-5) Right (1-5)   Hip Flexion 4 4+   Hip Extension  Glut max 3-  3- 3-  3-   Hip ABD (sidelying with pelvic neutral, and no hip flexion compensation ) 3- 3-   Hip ADD (seated) 5 5   Hip ER 4+ 5-   Hip IR 4+ 4+         4.   Patient will improve pain in right hip to 1-2/10 at worst to improve activity tolerance and restore prior level of function.   Eval Status: 10/10 at worst  PN 2/28/22: worst pain within the last week = 4-5/10   Last PN: 4/11/22 Pt reports worst pain in the last 48 hrs 4/10-progressing-goal to be met in 4 more weeks     New goals to address in the next 6 weeks:  1. The pt will complete a squat 10 with moderate (or greater) decent and proper body mechanics  to demo an increase in LE strength for stairs and community activities. PN2/28/22: Squat TRX chair taps x5, poor heel awareness, impaired ability to maintain PPT, right lateral lean, and muscle fatigue  Last PN: 4/11/22 Increased lumbar lordosis, decreased PPT, increased lumbar flexion, decreased knee flexion-goal to be met in 4 more weeks     2. The pt will improve LTR by 2 inches bilaterally to demo an incrase in lumbar mobility required for amb and IADLs. PN 2/28/22: LTR: right = 18.5 inches , left = 19 inches   Last PN 4/11/22 LTR:  LTR: right = 17 inches , left = 17.5 wwxjik-szcifeqlapg-wyhz to be met in 4 more weeks     New goal: 4/11/22- to be met in 4 more weeks  1) Pt will have 2 inch improvement in lumbar flexion so that pt can pick objects off the ground.   Last PN 4/11/22 Lumbar flexion:  11 inches fingertips to the ground, no reversal of lumbar lordosis        PLAN  []  Upgrade activities as tolerated     [x]  Continue plan of care  []  Update interventions per flow sheet       []  Discharge due to:_  []  Other:_      Linda Price, PT 4/18/2022  11:02 AM    Future Appointments   Date Time Provider [place] Raphael   4/20/2022 10:45 AM Delaney Soliz, PT [facility]   4/25/2022  9:15 AM Roberth Reed, PT [facility]   4/28/2022  8:30 AM Tahir Asif, PT [facility]

## 2022-04-20 ENCOUNTER — HOSPITAL ENCOUNTER (OUTPATIENT)
Dept: PHYSICAL THERAPY | Age: 72
Discharge: HOME | End: 2022-04-20
Payer: MEDICARE

## 2022-04-20 PROCEDURE — 97530 THERAPEUTIC ACTIVITIES: CPT

## 2022-04-20 PROCEDURE — 97110 THERAPEUTIC EXERCISES: CPT

## 2022-04-20 PROCEDURE — 97112 NEUROMUSCULAR REEDUCATION: CPT

## 2022-04-20 NOTE — PROGRESS NOTES
PT DAILY TREATMENT NOTE    Patient Name: Cayden Mckeon  Date:2022  : 1950  [x]  Patient  Verified  Payor: [place] MEDICARE / Plan: [place] MEDICARE PART A & B / Product Type: Medicare /    In time:10:43   Out time:11:31  Total Treatment Time (min): 48  Total Timed Codes (min): 48  1:1 Treatment Time (MC/BCBS only): 42   Visit #: 14 of 20    Treatment Dx: Right hip pain [M25.551]    SUBJECTIVE  Pain Level (0-10 scale): 0/10  Any medication changes, allergies to medications, adverse drug reactions, diagnosis change, or new procedure performed?: [x] No    [] Yes (see summary sheet for update)  Subjective functional status/changes:   [] No changes reported  Pt reports that he is tired. OBJECTIVE        14 min Therapeutic Exercise:  [] See flow sheet :   Rationale: increase ROM, increase strength, improve coordination, improve balance and increase proprioception to improve the patients ability to perform daily activities with decreased pain and symptom levels      10 min Therapeutic Activity:  []  See flow sheet :   Rationale: increase ROM, increase strength, improve coordination, improve balance and increase proprioception  to improve the patients ability to perform daily activities with decreased pain and symptom levels       24 min Neuromuscular Re-education:  []  See flow sheet :   Rationale: increase ROM, increase strength, improve coordination, improve balance and increase proprioception  to improve the patients ability to perform daily activities with decreased pain and symptom levels      With   [x] TE   [x] TA   [x] neuro   [] other: Patient Education: [x] Review HEP    [] Progressed/Changed HEP based on:   [x] positioning   [x] body mechanics   [x] transfers   [] heat/ice application    [] other:      Other Objective/Functional Measures: Pt enters gym in no apparent distress.        Pain Level (0-10 scale) post treatment: 0/10    ASSESSMENT/Changes in Function: Patient tolerated therapy session well as there were no adverse reactions today. Pt requires tactile cuing with performing 90-90 PPT as pt would go more into lumbar extension. Pt requires max tactile cuing with left adductor pull back. Pt fatigued this therapy session so did not perform full POC. Pt is progressing with therapy as indicated by pt tolerating increase in exercise repetitions and resistance. Although showing progress patient would benefit from continuation of skilled physical therapy to address the remaining limitations. Patient will continue to benefit from skilled PT services to modify and progress therapeutic interventions, address functional mobility deficits, address ROM deficits, address strength deficits, analyze and address soft tissue restrictions, analyze and cue movement patterns, analyze and modify body mechanics/ergonomics, assess and modify postural abnormalities and instruct in home and community integration to attain remaining goals. [x]  See Plan of Care  []  See progress note/recertification  []  See Discharge Summary         Progress towards goals / Updated goals:  Short Term Goals: To be accomplished in 4 weeks:  1. Patient will be independent and compliant with HEP to progress toward goals and restore functional mobility. Eval Status: issued at eval  PN: 2/25/22: provided ADL handou  Last PN: Patient reports daily performance with his HEP.   3/2/22, met     2. Pt will have painfree right hip AROM WFL to aid in functional mechanics for ambulation/ADLs.   Eval Status:   Hip Left Right   Flexion 94 80   ER 20 16   IR 25 19      PN 2/28/22: progressing   Hip Left Right   Flexion 95 96   ER 24 23   IR 26 25   *Flexion measured in supine with knee flexion, ER/IR measured in sitting with towel under knee*   Last PN: 4/11/2248-czmwreorjvc-cspz to be met in 4 more weeks.   Hip  PROM Left Right   Flexion 78 75   ER 25 30   IR 30 25   *Flexion measured in supine with knee in extension, ER/IR measured in sitting with towel under knee*    Current: 4/20/22 not assessed         3. Patient will demonstrate 10x bridges to full height to indicate function glut and hamsting activation and strength.               Eval: Performed 5x bridges with decreased height and poor posterior chain activation             PN 2/28/22: pt completed 2x5 with TC and VC for body mechanics; pt reporters slight discomfort and fatigue  Current 4/18/22: pt completed 4x5 bridges with TC/VC for body mechanics; Moderate elevation and reports of fatigue post activity      Long Term Goals: To be accomplished in 8 weeks:  1. Patient will improve FOTO score by 11 points to improve functional tolerance for performing ADLs. Eval Status: FOTO 52  PN: 2/25/22: 59  Last PN: 4/11/22: performed on 3/23/22 73-olekhakwpox-ddol to be met in 4 more weeks  FOTO score = an established functional score where 100 = no disability   Current: 4/20/22 will assess on 15th visit, next visit     3.   Pt will have 5/5 bilat LE strength to return to goals of ambulation and ADLs. Eval Status:                             Hip Left (1-5) Right (1-5)   Hip Flexion 4 4   Hip Extension NT NT   Hip ABD (seated) 4+ 4+   Hip ADD (seated) 4+ 4+   Hip ER 4 4   Hip IR 4- 4      Knee Left (1-5) Right (1-5)   Knee Flexion 4+ 4+   Knee Extension 5 5   Ankle PF 4+ 4+   Ankle DF 5 5   Other       Notes: Trunk compensation with MMT; poor hamstring and glut activation during PPT; cramping in left hamstrings during bridges     Last PN: 4/11/22 -progressing-goal to be met in 4 more weeks  Hip Left (1-5) Right (1-5)   Hip Flexion 4 4+   Hip Extension  Glut max 3-  3- 3-  3-   Hip ABD (sidelying with pelvic neutral, and no hip flexion compensation ) 3- 3-   Hip ADD (seated) 5 5   Hip ER 4+ 5-   Hip IR 4+ 4+   Current: 4/20/22 pt fatigued with s/l right glut max-progressing      4.   Patient will improve pain in right hip to 1-2/10 at worst to improve activity tolerance and restore prior level of function.   Eval Status: 10/10 at worst  PN 2/28/22: worst pain within the last week = 4-5/10   Last PN: 4/11/22 Pt reports worst pain in the last 48 hrs 4/10-progressing-goal to be met in 4 more weeks   Current: 4/20/22 rates pain 0/10 this therapy session    New goals to address in the next 6 weeks:  1. The pt will complete a squat 10 with moderate (or greater) decent and proper body mechanics  to demo an increase in LE strength for stairs and community activities. PN2/28/22: Squat TRX chair taps x5, poor heel awareness, impaired ability to maintain PPT, right lateral lean, and muscle fatigue  Last PN: 4/11/22 Increased lumbar lordosis, decreased PPT, increased lumbar flexion, decreased knee flexion-goal to be met in 4 more weeks   Current: 4/20/22 not assessed    2. The pt will improve LTR by 2 inches bilaterally to demo an incrase in lumbar mobility required for amb and IADLs. PN 2/28/22: LTR: right = 18.5 inches , left = 19 inches   Last PN 4/11/22 LTR:  LTR: right = 17 inches , left = 17.5 nrgftr-ztowslfxsdt-wcvb to be met in 4 more weeks   Current: 4/20/22 not assessed    New goal: 4/11/22- to be met in 4 more weeks  1) Pt will have 2 inch improvement in lumbar flexion so that pt can pick objects off the ground.   Last PN 4/11/22 Lumbar flexion:  11 inches fingertips to the ground, no reversal of lumbar lordosis  Current: 4/20/22 not assessed      PLAN  []  Upgrade activities as tolerated     [x]  Continue plan of care  []  Update interventions per flow sheet       []  Discharge due to:_  []  Other:_      Vincentian Lauren, PT, DPT, CIMT 4/20/2022  9:16 AM    Future Appointments   Date Time Provider [place] Raphael   4/20/2022 10:45 AM Christian Andrade, PT [facility]   4/25/2022  9:15 AM Spenser Rivera PT [facility]   4/28/2022  8:30 AM Kylie Crawford PT [facility]   5/3/2022  8:30 AM NATALIE Murillo [facility]   5/5/2022  8:30 AM NATALIE Palacio [facility]   5/11/2022  8:30 AM NATALIE Palacio [facility] 5/13/2022  8:30 AM Callie Gerber PT [facility]

## 2022-04-25 ENCOUNTER — HOSPITAL ENCOUNTER (OUTPATIENT)
Dept: PHYSICAL THERAPY | Age: 72
Discharge: HOME | End: 2022-04-25
Payer: MEDICARE

## 2022-04-25 ENCOUNTER — TELEPHONE (OUTPATIENT)
Dept: PHYSICAL THERAPY | Age: 72
End: 2022-04-25

## 2022-04-25 PROCEDURE — 97110 THERAPEUTIC EXERCISES: CPT

## 2022-04-25 PROCEDURE — 97530 THERAPEUTIC ACTIVITIES: CPT

## 2022-04-25 PROCEDURE — 97112 NEUROMUSCULAR REEDUCATION: CPT

## 2022-04-25 NOTE — PROGRESS NOTES
PT DAILY TREATMENT NOTE    Patient Name: Keerthi Morton  Date:2022  : 1950  [x]  Patient  Verified  [place]or: [place] MED[place] / Plan: [place] MEDICARE PART A & B / Product Type: Medicare /    In time:9:58am  Out time:10:52  Total Treatment Time (min): 54  Total Timed Codes (min): 54  1:1 Treatment Time (MC/BCBS only): 47   Visit #: 15 of 20    Treatment Dx: Right hip pain [M25.551]    SUBJECTIVE  Pain Level (0-10 scale): 0  Any medication changes, allergies to medications, adverse drug reactions, diagnosis change, or new procedure performed?: [x] No    [] Yes (see summary sheet for update)  Subjective functional status/changes:   [] No changes reported  The pt reported that he thought his appointment was at 10 am and not 9:15 am due to blood work     OBJECTIVE    26 min Therapeutic Exercise:  [x] See flow sheet :   Rationale: increase ROM, increase strength and improve coordination to improve the patients ability to perform daily activities with decreased pain and symptom levels    18 min Therapeutic Activity:  [x]  See flow sheet : updated goals for PN; PT completed FOTO with the pt for best understanding of the questions    Rationale: increase ROM, increase strength, improve coordination, improve balance and increase proprioception  to improve the patients ability to perform daily activities with decreased pain and symptom levels     9 min Neuromuscular Re-education:  [x]  See flow sheet :   Rationale: increase ROM, increase strength, improve coordination, improve balance and increase proprioception  to improve the patients ability to perform daily activities with decreased pain and symptom levels          With   [] TE   [] TA   [] neuro   [] other: Patient Education: [x] Review HEP    [] Progressed/Changed HEP based on:   [] positioning   [] body mechanics   [] transfers   [] heat/ice application    [] other:      Other Objective/Functional Measures: updated goals for PN      Pain Level (0-10 scale) post treatment: 0    ASSESSMENT/Changes in Function:  Patient is a 69 yo male who [facility] to [facility] with c/o chronic right anterior hip and groin pain, with most recent exacerbation beginning 2 months prior to IE. This is patients 15th treatment session including the IE. Pt reported 80 % improvement of symptoms since the start of therapy; reporting examples of improvement with decreased pain and stiffness during daily activities and an improved mobility. Pt continues to report deficits with stiffness with prolong sitting chair. Per updated goals the pt continues to be limited in hip ROM, lumbar mobility (poor rib mobility), gross hip ABD/extension, and functional LE strength. Patient will continue to benefit from skilled PT services to modify and progress therapeutic interventions, address functional mobility deficits, address ROM deficits, address strength deficits, analyze and address soft tissue restrictions, analyze and cue movement patterns, analyze and modify body mechanics/ergonomics, assess and modify postural abnormalities and address imbalance/dizziness to attain remaining goals. [x]  See Plan of Care  []  See progress note/recertification  []  See Discharge Summary         Progress towards goals / Updated goals:  Short Term Goals: To be accomplished in 4 weeks:  1. Patient will be independent and compliant with HEP to progress toward goals and restore functional mobility. Eval Status: issued at eval  PN: 2/25/22: provided ADL handou  Last PN: Patient reports daily performance with his HEP.   3/2/22, met     2. Pt will have painfree right hip AROM WFL to aid in functional mechanics for ambulation/ADLs.   Eval Status:   Hip Left Right   Flexion 94 80   ER 20 16   IR 25 19      PN 2/28/22: progressing   Hip Left Right   Flexion 95 96   ER 24 23   IR 26 25   *Flexion measured in supine with knee flexion, ER/IR measured in sitting with towel under knee*   Last PN: 4/11/2277-bwjyntmkkou-mocp to be met in 4 more weeks.   Hip  PROM Left Right   Flexion 78 75   ER 25 30   IR 30 25   *Flexion measured in supine with knee in extension, ER/IR measured in sitting with towel under knee*      Current 4/25/22: progressing   Hip  PROM Left Right   Flexion 92 100   ER 28 23   IR 30 30   *Flexion measured in supine with knee in extension, ER/IR measured in sitting with towel under knee*           3. Patient will demonstrate 10x bridges to full height to indicate function glut and hamsting activation and strength.               Eval: Performed 5x bridges with decreased height and poor posterior chain activation             PN 2/28/22: pt completed 2x5 with TC and VC for body mechanics; pt reporters slight discomfort and fatigue  Current 4/18/22: pt completed 4x5 bridges with TC/VC for body mechanics; Moderate elevation and reports of fatigue post activity    Current 4/25/22: bridge 10x with moderate elevation and reports of muscle fatigue - progressing     Long Term Goals: To be accomplished in 8 weeks:  1. Patient will improve FOTO score by 11 points to improve functional tolerance for performing ADLs. Eval Status: FOTO 52  PN: 2/25/22: 59  Last PN: 4/11/22: performed on 3/23/22 39-[id number]-quva to be met in 4 more weeks  FOTO score = an established functional score where 100 = no disability   Current 4/25/22: 66 - MET     3.   Pt will have 5/5 bilat LE strength to return to goals of ambulation and ADLs.   Eval Status:                             Hip Left (1-5) Right (1-5)   Hip Flexion 4 4   Hip Extension NT NT   Hip ABD (seated) 4+ 4+   Hip ADD (seated) 4+ 4+   Hip ER 4 4   Hip IR 4- 4      Knee Left (1-5) Right (1-5)   Knee Flexion 4+ 4+   Knee Extension 5 5   Ankle PF 4+ 4+   Ankle DF 5 5   Other       Notes: Trunk compensation with MMT; poor hamstring and glut activation during PPT; cramping in left hamstrings during bridges     Last PN: 4/11/22 -progressing-goal to be met in 4 more weeks  Hip Left (1-5) Right (1-5) Hip Flexion 4 4+   Hip Extension  Glut max 3-  3- 3-  3-   Hip ABD (sidelying with pelvic neutral, and no hip flexion compensation ) 3- 3-   Hip ADD (seated) 5 5   Hip ER 4+ 5-   Hip IR 4+ 4+        Current 4/25/22: progressing   Hip Left (1-5) Right (1-5)   Hip Flexion 4+ 4+   Hip Extension  Glut max 3+ 3+   Hip ABD (sidelying with pelvic neutral, and no hip flexion compensation ) 4 3+   Hip ADD (seated) 5 5   Hip ER 4+ 4+   Hip IR 4+ 4+          4.   Patient will improve pain in right hip to 1-2/10 at worst to improve activity tolerance and restore prior level of function. Eval Status: 10/10 at worst  PN 2/28/22: worst pain within the last week = 4-5/10   Last PN: 4/11/22 Pt reports worst pain in the last 48 hrs 4/10-progressing-goal to be met in 4 more weeks   Current 4/25/22: worst pain within the last week 1-2/10 - MET     New goals to address in the next 6 weeks:  1. The pt will complete a squat 10 with moderate (or greater) decent and proper body mechanics  to demo an increase in LE strength for stairs and community activities. PN2/28/22: Squat TRX chair taps x5, poor heel awareness, impaired ability to maintain PPT, right lateral lean, and muscle fatigue  Last PN: 4/11/22 Increased lumbar lordosis, decreased PPT, increased lumbar flexion, decreased knee flexion-goal to be met in 4 more weeks   Current 4/25/22:  squat 10 with moderate decent - VC for proper body mechanics, impaired ability to maintain PPT, muscle fatigue - progressing      2. The pt will improve LTR by 2 inches bilaterally to demo an incrase in lumbar mobility required for amb and IADLs.   PN 2/28/22: LTR: right = 18.5 inches , left = 19 inches   Last PN 4/11/22 LTR:  LTR: right = 17 inches , left = 17.5 puhlzf-rppygoeernc-sehv to be met in 4 more weeks   Current 4/25/22: same as last assessed - progressing      New goal: 4/11/22- to be met in 4 more weeks  1) Pt will have 2 inch improvement in lumbar flexion so that pt can pick objects off the ground.   Last PN 4/11/22 Lumbar flexion:  11 inches fingertips to the ground, no reversal of lumbar lordosis   Current 4/25/22: 4 inches from the floor = lumbar flexion - progressing            PLAN  []  Upgrade activities as tolerated     [x]  Continue plan of care  []  Update interventions per flow sheet       []  Discharge due to:_  []  Other:_      Sonny Christine, PT 4/25/2022  9:53 AM    Future Appointments   Date Time Provider Estevan Raphael   4/28/2022  8:30 AM Brookesmith Po, PT [facility]   5/3/2022  8:30 AM Ed Layer, PT [facility]   5/5/2022  8:30 AM Brookesmith Po, PT [facility]   5/11/2022  8:30 AM Brookesmith Po, PT [facility]   5/13/2022  8:30 AM Nicholos Bible, PT [facility]   5/17/2022  8:30 AM Nicholos Bible, PT [facility]   5/19/2022  8:30 AM Nicholos Bible, PT [facility]   5/23/2022  8:30 AM Nicholos Bible, PT [facility]   5/25/2022  8:30 AM Nicholos Bible, PT [facility]

## 2022-04-25 NOTE — PROGRESS NOTES
[facility] at the [address], [place], [address] [facility]  Phone: [phone]      Fax:  [phone]    Continued Plan of Care/ Re-certification for Physical Therapy Services    Patient name: Conrad Cox Start of Care: 2022   Referral source: Kameron Ruth MD : 1950   Medical/Treatment Diagnosis: Right hip pain [M25.551] Onset Date:~2 months ago     Prior Hospitalization: see medical history Provider#: [id number]   Medications: Verified on Patient Summary List    Comorbidities: OA, Osteoporosis, Pre-diabetic, Depression, RA, Right RCR 2018, Left RCR , hx of left knee scope   Prior Level of Function: functionally independent, no AD, somewhat active lifestyle, enjoys working in his lawn    Visits from Start of Care: 15    Missed Visits: 6    Reporting Period: 22 to 22    The Plan of Care and following information is based on the patient's current status:    Key functional changes: increased       Problems/ barriers to goal attainment: gross LE strength, hip mobility, and pain that impacts functional activity tolerance     Problem List: pain affecting function, decrease ROM, decrease strength, edema affecting function, impaired gait/ balance, decrease ADL/ functional abilitiies, decrease activity tolerance and decrease flexibility/ joint mobility    Treatment Plan: Therapeutic exercise, Therapeutic activities, Neuromuscular re-education, Physical agent/modality, Gait/balance training, Manual therapy, Patient education, Self Care training and Stair training     Goals for this certification period to be accomplished in 4 weeks:  Short Term Goals: To be accomplished in 4 weeks:  1. Patient will be independent and compliant with HEP to progress toward goals and restore functional mobility. Eval Status: issued at eval  PN: 22: provided ADL handou  PN: Patient reports daily performance with his HEP.   3/2/22, met     2.  Pt will have painfree right hip AROM WFL to aid in functional mechanics for ambulation/ADLs. Eval Status:   Hip Left Right   Flexion 94 80   ER 20 16   IR 25 19      Last PN: 4/11/2234-yqwqlcnnydr-gctj to be met in 4 more weeks.   Hip  PROM Left Right   Flexion 78 75   ER 25 30   IR 30 25   *Flexion measured in supine with knee in extension, ER/IR measured in sitting with towel under knee*       Current 4/25/22: progressing   Hip  PROM Left Right   Flexion 92 100   ER 28 23   IR 30 30   *Flexion measured in supine with knee in flexion, ER/IR measured in sitting with towel under knee*           3. Patient will demonstrate 10x bridges to full height to indicate function glut and hamsting activation and strength.    Eval: Performed 5x bridges with decreased height and poor posterior chain activation   Current 4/18/22: pt completed 4x5 bridges with TC/VC for body mechanics; Moderate elevation and reports of fatigue post activity    Current 4/25/22: bridge 10x with moderate elevation and reports of muscle fatigue - progressing     Long Term Goals: To be accomplished in 8 weeks:  1. Patient will improve FOTO score by 11 points to improve functional tolerance for performing ADLs. Eval Status: FOTO 52  PN: 2/25/22: 59  Last PN: 4/11/22: performed on 3/23/22 13-awywyqkolee-vwiv to be met in 4 more weeks   Current 4/25/22: 66 - MET  FOTO score = an established functional score where 100 = no disability     3.   Pt will have 5/5 bilat LE strength to return to goals of ambulation and ADLs.   Eval Status:                             Hip Left (1-5) Right (1-5)   Hip Flexion 4 4   Hip Extension NT NT   Hip ABD (seated) 4+ 4+   Hip ADD (seated) 4+ 4+   Hip ER 4 4   Hip IR 4- 4      Knee Left (1-5) Right (1-5)   Knee Flexion 4+ 4+   Knee Extension 5 5   Ankle PF 4+ 4+   Ankle DF 5 5   Other       Notes: Trunk compensation with MMT; poor hamstring and glut activation during PPT; cramping in left hamstrings during bridges     Last PN: 4/11/22 -progressing-goal to be met in 4 more weeks  Hip Left (1-5) Right (1-5)   Hip Flexion 4 4+   Hip Extension  Glut max 3-  3- 3-  3-   Hip ABD (sidelying with pelvic neutral, and no hip flexion compensation ) 3- 3-   Hip ADD (seated) 5 5   Hip ER 4+ 5-   Hip IR 4+ 4+          Current 4/25/22: progressing   Hip Left (1-5) Right (1-5)   Hip Flexion 4+ 4+   Hip Extension  Glut max 3+ 3+   Hip ABD (sidelying with pelvic neutral, and no hip flexion compensation ) 4 3+   Hip ADD (seated) 5 5   Hip ER 4+ 4+   Hip IR 4+ 4+         4.   Patient will improve pain in right hip to 1-2/10 at worst to improve activity tolerance and restore prior level of function. Eval Status: 10/10 at worst  Last PN: 4/11/22 Pt reports worst pain in the last 48 hrs 4/10-progressing-goal to be met in 4 more weeks   Current 4/25/22: worst pain within the last week 1-2/10 - MET     New goals to address in the next 6 weeks:  1. The pt will complete a squat 10 with moderate (or greater) decent and proper body mechanics  to demo an increase in LE strength for stairs and community activities. PN2/28/22: Squat TRX chair taps x5, poor heel awareness, impaired ability to maintain PPT, right lateral lean, and muscle fatigue  Last PN: 4/11/22 Increased lumbar lordosis, decreased PPT, increased lumbar flexion, decreased knee flexion-goal to be met in 4 more weeks   Current 4/25/22:  squat 10 with moderate decent - VC for proper body mechanics, impaired ability to maintain PPT, muscle fatigue - progressing      2. The pt will improve LTR by 2 inches bilaterally to demo an incrase in lumbar mobility required for amb and IADLs.   PN 2/28/22: LTR: right = 18.5 inches , left = 19 inches   Last PN 4/11/22 LTR:  LTR: right = 17 inches , left = 17.5 yjbctc-gcdbnslacdf-zuzg to be met in 4 more weeks   Current 4/25/22: same as last assessed - progressing      New goal: 4/11/22- to be met in 4 more weeks  1) Pt will have 2 inch improvement in lumbar flexion so that pt can pick objects off the ground. Last PN 4/11/22 Lumbar flexion:  11 inches fingertips to the ground, no reversal of lumbar lordosis   Current 4/25/22: 4 inches from the floor = lumbar flexion - progressing         Frequency / Duration: Patient to be seen 2 times per week for 4 weeks:    Assessment / Audrey José Luis is a 69 yo male who presents to [facility] with c/o chronic right anterior hip and groin pain, with most recent exacerbation beginning 2 months prior to IE. This is patients 15th treatment session including the IE. Pt reported 80 % improvement of symptoms since the start of therapy; reporting examples of improvement with decreased pain and stiffness during daily activities and an improved mobility. Pt continues to report deficits with stiffness with prolong sitting chair. Per updated goals the pt continues to be limited in hip ROM, lumbar mobility (poor rib mobility), gross hip ABD/extension, and functional LE strength. Patient will continue to benefit from skilled PT services to modify and progress therapeutic interventions, address functional mobility deficits, address ROM deficits, address strength deficits, analyze and address soft tissue restrictions, analyze and cue movement patterns, analyze and modify body mechanics/ergonomics, assess and modify postural abnormalities and address imbalance/dizziness to attain remaining goals. New Certification Period:  April 25, 2022 to May 25, 2022      Nicki Otero, PT 4/25/2022 11:02 AM    ________________________________________________________________________  I certify that the above Therapy Services are being furnished while the patient is under my care. I agree with the treatment plan and certify that this therapy is necessary. [] I have read the above and request that my patient continue as recommended.   [] I have read the above report and request that my patient continue therapy with the following changes/special instructions: _______________________________________  [] I have read the above report and request that my patient be discharged from therapy    Physician's Signature:____________________ Date:_________ TIME:________                                      Sam Zamora MD      ** Signature, Date and Time must be completed for valid certification **    Please sign and return to [facility] at the [address], [facility], [address] [facility] [address]  Phone: [phone]      Fax:  [phone]

## 2022-04-28 ENCOUNTER — HOSPITAL ENCOUNTER (OUTPATIENT)
Dept: PHYSICAL THERAPY | Age: 72
Discharge: HOME | End: 2022-04-28
Payer: MEDICARE

## 2022-04-28 PROCEDURE — 97530 THERAPEUTIC ACTIVITIES: CPT

## 2022-04-28 PROCEDURE — 97110 THERAPEUTIC EXERCISES: CPT

## 2022-04-28 NOTE — PROGRESS NOTES
PT DAILY TREATMENT NOTE    Patient Name: Zo Dougherty  Date:2022  : 1950  [x]  Patient  Verified  Payor: [place] MED[place] / Plan: [place] MEDICARE PART A & B / Product Type: Medicare /    In time:8:34  Out time:9:19  Total Treatment Time (min): 45  Total Timed Codes (min): 45  1:1 Treatment Time (MC/BCBS only): 45   Visit #: 16 of 23    Treatment Dx: Right hip pain [M25.551]    SUBJECTIVE  Pain Level (0-10 scale): 0  Any medication changes, allergies to medications, adverse drug reactions, diagnosis change, or new procedure performed?: [x] No    [] Yes (see summary sheet for update)  Subjective functional status/changes:   [] No changes reported  Pt reports feeling light headed this morning. OBJECTIVE    25 min Therapeutic Exercise:  [x] See flow sheet :   Rationale: increase ROM and increase strength to improve the patients ability to stabilize hip during functional activities. 20 min Therapeutic Activity:  [x]  See flow sheet :   Rationale: increase ROM, increase strength and improve coordination  to improve the patients ability to perform ADLs painfree. With   [] TE   [] TA   [] neuro   [] other: Patient Education: [x] Review HEP    [] Progressed/Changed HEP based on:   [] positioning   [] body mechanics   [] transfers   [] heat/ice application    [] other:      Other Objective/Functional Measures: BP: 162/72 mmHg 160/90 mmHg  Pain Level (0-10 scale) post treatment: 0    ASSESSMENT/Changes in Function: Pt with poor tolerance to today's session. Pt reported dizziness during performance of 90/90 alt kicks. BP assessment revealed elevated BP. Exercise session modified to avoid supine exercises and high level of exertion exercises. Pt with good tolerance to addition of Seated hip flexion eccentric in order to improve strength of deep hip flexors. Pt is making good progress towards their goals.  Pt will continue to benefit from skilled therapeutic intervention at this time to address th remaining deficits as discussed in goals below. Patient will continue to benefit from skilled PT services to modify and progress therapeutic interventions, address functional mobility deficits, address ROM deficits, address strength deficits, analyze and address soft tissue restrictions, analyze and cue movement patterns, analyze and modify body mechanics/ergonomics, assess and modify postural abnormalities and address imbalance/dizziness to attain remaining goals. [x]  See Plan of Care  []  See progress note/recertification  []  See Discharge Summary         Progress towards goals / Updated goals:  Short Term Goals: To be accomplished in 4 weeks:  1. Patient will be independent and compliant with HEP to progress toward goals and restore functional mobility. Eval Status: issued at eval  PN: 2/25/22: provided ADL handou  Last PN: Patient reports daily performance with his HEP.   3/2/22, met     2. Pt will have painfree right hip AROM WFL to aid in functional mechanics for ambulation/ADLs.   Eval Status:   Hip Left Right   Flexion 94 80   ER 20 16   IR 25 19      PN 2/28/22: progressing   Hip Left Right   Flexion 95 96   ER 24 23   IR 26 25   *Flexion measured in supine with knee flexion, ER/IR measured in sitting with towel under knee*   Last PN: 4/11/2272-[id number]-babz to be met in 4 more weeks.   Hip  PROM Left Right   Flexion 78 75   ER 25 30   IR 30 25   *Flexion measured in supine with knee in extension, ER/IR measured in sitting with towel under knee*       Current 4/25/22: progressing   Hip  PROM Left Right   Flexion 92 100   ER 28 23   IR 30 30   *Flexion measured in supine with knee in extension, ER/IR measured in sitting with towel under knee*           3. Patient will demonstrate 10x bridges to full height to indicate function glut and hamsting activation and strength.               Eval: Performed 5x bridges with decreased height and poor posterior chain activation             PN 2/28/22: pt completed 2x5 with TC and VC for body mechanics; pt reporters slight discomfort and fatigue  Current 4/18/22: pt completed 4x5 bridges with TC/VC for body mechanics; Moderate elevation and reports of fatigue post activity    Current 4/25/22: bridge 10x with moderate elevation and reports of muscle fatigue - progressing     Long Term Goals: To be accomplished in 8 weeks:  1. Patient will improve FOTO score by 11 points to improve functional tolerance for performing ADLs. Eval Status: FOTO 52  PN: 2/25/22: 59  Last PN: 4/11/22: performed on 3/23/22 [id number] to be met in 4 more weeks  FOTO score = an established functional score where 100 = no disability   Current 4/25/22: 66 - MET     3.   Pt will have 5/5 bilat LE strength to return to goals of ambulation and ADLs. Eval Status:                             Hip Left (1-5) Right (1-5)   Hip Flexion 4 4   Hip Extension NT NT   Hip ABD (seated) 4+ 4+   Hip ADD (seated) 4+ 4+   Hip ER 4 4   Hip IR 4- 4      Knee Left (1-5) Right (1-5)   Knee Flexion 4+ 4+   Knee Extension 5 5   Ankle PF 4+ 4+   Ankle DF 5 5   Other       Notes: Trunk compensation with MMT; poor hamstring and glut activation during PPT; cramping in left hamstrings during bridges     Last PN: 4/11/22 -progressing-goal to be met in 4 more weeks  Hip Left (1-5) Right (1-5)   Hip Flexion 4 4+   Hip Extension  Glut max 3-  3- 3-  3-   Hip ABD (sidelying with pelvic neutral, and no hip flexion compensation ) 3- 3-   Hip ADD (seated) 5 5   Hip ER 4+ 5-   Hip IR 4+ 4+          Current 4/25/22: progressing   Hip Left (1-5) Right (1-5)   Hip Flexion 4+ 4+   Hip Extension  Glut max 3+ 3+   Hip ABD (sidelying with pelvic neutral, and no hip flexion compensation ) 4 3+   Hip ADD (seated) 5 5   Hip ER 4+ 4+   Hip IR 4+ 4+            4.   Patient will improve pain in right hip to 1-2/10 at worst to improve activity tolerance and restore prior level of function.   Eval Status: 10/10 at worst  PN 2/28/22: worst pain within the last week = 4-5/10   Last PN: 4/11/22 Pt reports worst pain in the last 48 hrs 4/10-progressing-goal to be met in 4 more weeks   Current 4/25/22: worst pain within the last week 1-2/10 - MET     New goals to address in the next 6 weeks:  1. The pt will complete a squat 10 with moderate (or greater) decent and proper body mechanics  to demo an increase in LE strength for stairs and community activities. PN2/28/22: Squat TRX chair taps x5, poor heel awareness, impaired ability to maintain PPT, right lateral lean, and muscle fatigue  Last PN: 4/11/22 Increased lumbar lordosis, decreased PPT, increased lumbar flexion, decreased knee flexion-goal to be met in 4 more weeks   Current 4/25/22:  squat 10 with moderate decent - VC for proper body mechanics, impaired ability to maintain PPT, muscle fatigue - progressing      2. The pt will improve LTR by 2 inches bilaterally to demo an incrase in lumbar mobility required for amb and IADLs. PN 2/28/22: LTR: right = 18.5 inches , left = 19 inches   Last PN 4/11/22 LTR:  LTR: right = 17 inches , left = 17.5 [id number]-ojbrxjasffx-woov to be met in 4 more weeks   Current 4/25/22: same as last assessed - progressing      New goal: 4/11/22- to be met in 4 more weeks  1) Pt will have 2 inch improvement in lumbar flexion so that pt can pick objects off the ground.   Last PN 4/11/22 Lumbar flexion:  11 inches fingertips to the ground, no reversal of lumbar lordosis   Current 4/25/22: 4 inches from the floor = lumbar flexion - progressing         PLAN  []  Upgrade activities as tolerated     [x]  Continue plan of care  []  Update interventions per flow sheet       []  Discharge due to:_  []  Other:_      Vee Armenta PT 4/28/2022  7:45 AM    Future Appointments   Date Time Provider Estevan Raphael   4/28/2022  8:30 AM NATALIE Quijano [facility]   5/3/2022  8:30 AM NATALIE Justin [facility]   5/5/2022  8:30 AM [facility]   5/11/2022  8:30 AM Tino Reece, Victoria Sonih [facility]   5/13/2022  8:30 AM Mony Uche, PT [facility]   5/17/2022  8:30 AM Mony Uche, PT [facility]   5/19/2022  8:30 AM Mony Uche, PT [facility]   5/23/2022  8:30 AM Mony Uche, PT [facility]   5/25/2022  8:30 AM Mony Uche, PT [facility]

## 2022-05-03 ENCOUNTER — HOSPITAL ENCOUNTER (OUTPATIENT)
Dept: PHYSICAL THERAPY | Age: 72
Discharge: HOME | End: 2022-05-03
Payer: MEDICARE

## 2022-05-03 PROCEDURE — 97530 THERAPEUTIC ACTIVITIES: CPT

## 2022-05-03 PROCEDURE — 97110 THERAPEUTIC EXERCISES: CPT

## 2022-05-03 NOTE — PROGRESS NOTES
PT DAILY TREATMENT NOTE    Patient Name: Tahir Coronel  Date:5/3/2022  : 1950  [x]  Patient  Verified  Payor: [place] MEDICARE / Plan: [place] MEDICARE PART A & B / Product Type: Medicare /    In time:3:00  Out time:3:46  Total Treatment Time (min): 46  Total Timed Codes (min): 46  1:1 Treatment Time (MC/BCBS only): 55   Visit #: 17 of 23    Treatment Dx: Right hip pain [M25.551]    SUBJECTIVE  Pain Level (0-10 scale): 0  Any medication changes, allergies to medications, adverse drug reactions, diagnosis change, or new procedure performed?: [x] No    [] Yes (see summary sheet for update)  Subjective functional status/changes:   [] No changes reported  Reports he still feels stiff when he sit in his recliner for too long. OBJECTIVE    20 min Therapeutic Exercise:  [x] See flow sheet :   Rationale: increase ROM and increase strength to improve the patients ability to perform ADLs    26 min Therapeutic Activity:  [x]  See flow sheet :   Rationale: increase ROM, increase strength, improve coordination and improve balance  to improve the patients ability to perform ADLs and return to PLOF. With   [] TE   [] TA   [] neuro   [] other: Patient Education: [x] Review HEP    [] Progressed/Changed HEP based on:   [] positioning   [] body mechanics   [] transfers   [] heat/ice application    [] other:      Other Objective/Functional Measures: Forward flexion 5 cm from ground. Pain Level (0-10 scale) post treatment: 0    ASSESSMENT/Changes in Function: Pt with good tolerance to today's session. Pt reported low tolerance for prone position during performance of hip extension. Pt with good tolerance to addition of peanut ball roll downs in order to improve lumbar flexion. Pt is making good progress towards their goals. Pt will continue to benefit from skilled therapeutic intervention at this time to address th remaining deficits as discussed in goals below.       Patient will continue to benefit from skilled PT services to modify and progress therapeutic interventions, address functional mobility deficits, address ROM deficits, address strength deficits, analyze and address soft tissue restrictions, analyze and cue movement patterns, analyze and modify body mechanics/ergonomics and assess and modify postural abnormalities to attain remaining goals. [x]  See Plan of Care  []  See progress note/recertification  []  See Discharge Summary         Progress towards goals / Updated goals:  Short Term Goals: To be accomplished in 4 weeks:  1. Patient will be independent and compliant with HEP to progress toward goals and restore functional mobility. Eval Status: issued at eval  PN: 2/25/22: provided ADL handou  Last PN: Patient reports daily performance with his HEP.   3/2/22, met     2. Pt will have painfree right hip AROM WFL to aid in functional mechanics for ambulation/ADLs.   Eval Status:   Hip Left Right   Flexion 94 80   ER 20 16   IR 25 19      PN 2/28/22: progressing   Hip Left Right   Flexion 95 96   ER 24 23   IR 26 25   *Flexion measured in supine with knee flexion, ER/IR measured in sitting with towel under knee*   Last PN: 4/11/2265-llnbuedzaoe-iipd to be met in 4 more weeks.   Hip  PROM Left Right   Flexion 78 75   ER 25 30   IR 30 25   *Flexion measured in supine with knee in extension, ER/IR measured in sitting with towel under knee*       Current 4/25/22: progressing   Hip  PROM Left Right   Flexion 92 100   ER 28 23   IR 30 30   *Flexion measured in supine with knee in extension, ER/IR measured in sitting with towel under knee*           3. Patient will demonstrate 10x bridges to full height to indicate function glut and hamsting activation and strength.               Eval: Performed 5x bridges with decreased height and poor posterior chain activation             PN 2/28/22: pt completed 2x5 with TC and VC for body mechanics; pt reporters slight discomfort and fatigue  Current 4/18/22: pt completed 4x5 bridges with TC/VC for body mechanics; Moderate elevation and reports of fatigue post activity    Current 4/25/22: bridge 10x with moderate elevation and reports of muscle fatigue - progressing     Long Term Goals: To be accomplished in 8 weeks:  1. Patient will improve FOTO score by 11 points to improve functional tolerance for performing ADLs. Eval Status: FOTO 52  PN: 2/25/22: 59  Last PN: 4/11/22: performed on 3/23/22 [id number]-ptai to be met in 4 more weeks  FOTO score = an established functional score where 100 = no disability   Current 4/25/22: 66 - MET     3.   Pt will have 5/5 bilat LE strength to return to goals of ambulation and ADLs. Eval Status:                             Hip Left (1-5) Right (1-5)   Hip Flexion 4 4   Hip Extension NT NT   Hip ABD (seated) 4+ 4+   Hip ADD (seated) 4+ 4+   Hip ER 4 4   Hip IR 4- 4      Knee Left (1-5) Right (1-5)   Knee Flexion 4+ 4+   Knee Extension 5 5   Ankle PF 4+ 4+   Ankle DF 5 5   Other       Notes: Trunk compensation with MMT; poor hamstring and glut activation during PPT; cramping in left hamstrings during bridges     Last PN: 4/11/22 -progressing-goal to be met in 4 more weeks  Hip Left (1-5) Right (1-5)   Hip Flexion 4 4+   Hip Extension  Glut max 3-  3- 3-  3-   Hip ABD (sidelying with pelvic neutral, and no hip flexion compensation ) 3- 3-   Hip ADD (seated) 5 5   Hip ER 4+ 5-   Hip IR 4+ 4+          Current 4/25/22: progressing   Hip Left (1-5) Right (1-5)   Hip Flexion 4+ 4+   Hip Extension  Glut max 3+ 3+   Hip ABD (sidelying with pelvic neutral, and no hip flexion compensation ) 4 3+   Hip ADD (seated) 5 5   Hip ER 4+ 4+   Hip IR 4+ 4+            4.   Patient will improve pain in right hip to 1-2/10 at worst to improve activity tolerance and restore prior level of function.   Eval Status: 10/10 at worst  PN 2/28/22: worst pain within the last week = 4-5/10   Last PN: 4/11/22 Pt reports worst pain in the last 48 hrs 4/10-progressing-goal to be met in 4 more weeks   Current 4/25/22: worst pain within the last week 1-2/10 - MET     New goals to address in the next 6 weeks:  1. The pt will complete a squat 10 with moderate (or greater) decent and proper body mechanics  to demo an increase in LE strength for stairs and community activities. PN2/28/22: Squat TRX chair taps x5, poor heel awareness, impaired ability to maintain PPT, right lateral lean, and muscle fatigue  Last PN: 4/11/22 Increased lumbar lordosis, decreased PPT, increased lumbar flexion, decreased knee flexion-goal to be met in 4 more weeks   Current 4/25/22:  squat 10 with moderate decent - VC for proper body mechanics, impaired ability to maintain PPT, muscle fatigue - progressing      2. The pt will improve LTR by 2 inches bilaterally to demo an incrase in lumbar mobility required for amb and IADLs. PN 2/28/22: LTR: right = 18.5 inches , left = 19 inches   Last PN 4/11/22 LTR:  LTR: right = 17 inches , left = 17.5 orsaph-ibdlnovdqyj-jjhn to be met in 4 more weeks   Current 4/25/22: same as last assessed - progressing      New goal: 4/11/22- to be met in 4 more weeks   1) Pt will have 2 inch improvement in lumbar flexion so that pt can pick objects off the ground.   Last PN 4/11/22 Lumbar flexion:  11 inches fingertips to the ground, no reversal of lumbar lordosis   Current 5/3/22: 5 inches from the floor = lumbar flexion - progressing         PLAN  []  Upgrade activities as tolerated     [x]  Continue plan of care  []  Update interventions per flow sheet       []  Discharge due to:_  []  Other:_      Gracie Payne, PT 5/3/2022  1:28 PM    Future Appointments   Date Time Provider Estevan Raphael   5/3/2022  3:00 PM Selin Plasencia, [address] [place] [facility]   5/5/2022  8:30 AM Selin Plasencia, PT [place] [facility]   5/11/2022  8:30 AM Selin Plasencia PT [place] [facility]   5/13/2022  8:30 AM NATALIE Mccurdy [facility]   5/17/2022  8:30 AM NATALIE Mccurdy [facility]   5/19/2022  8:30 AM NATALIE Mccurdy [facility] 5/23/2022  8:30 AM NATALIE Mcnair [facility]   5/25/2022  8:30 AM NATALIE Mcnair [facility]

## 2022-05-05 ENCOUNTER — HOSPITAL ENCOUNTER (OUTPATIENT)
Dept: PHYSICAL THERAPY | Age: 72
Discharge: HOME | End: 2022-05-05
Payer: MEDICARE

## 2022-05-05 PROCEDURE — 97112 NEUROMUSCULAR REEDUCATION: CPT

## 2022-05-05 PROCEDURE — 97110 THERAPEUTIC EXERCISES: CPT

## 2022-05-05 PROCEDURE — 97530 THERAPEUTIC ACTIVITIES: CPT

## 2022-05-05 NOTE — PROGRESS NOTES
PT DAILY TREATMENT NOTE    Patient Name: Isadora Mack  Date:2022  : 1950  [x]  Patient  Verified  Payor: Luke Randolph / Plan: [place] & B / Product Type: Medicare /    In time:8:34  Out time: 9:17  Total Treatment Time (min): 43  Total Timed Codes (min): 43  1:1 Treatment Time (MC/BCBS only): 43   Visit #: 18 of 23    Treatment Dx: Right hip pain [M25.551]    SUBJECTIVE  Pain Level (0-10 scale): 0  Any medication changes, allergies to medications, adverse drug reactions, diagnosis change, or new procedure performed?: [x] No    [] Yes (see summary sheet for update)  Subjective functional status/changes:   [] No changes reported  Pt reports he was a little sore yesterday but it went away. OBJECTIVE    9 min Therapeutic Exercise:  [x] See flow sheet :   Rationale: increase ROM and increase strength to improve the patients ability to perform yard work. 12 min Therapeutic Activity:  [x]  See flow sheet :   Rationale: increase ROM, increase strength and improve coordination  to improve the patients ability to perform ADLs     22 min Neuromuscular Re-education:  [x]  See flow sheet :   Rationale: increase ROM, increase strength and improve coordination  to improve the patients ability to perform ADLs. With   [] TE   [] TA   [] neuro   [] other: Patient Education: [x] Review HEP    [] Progressed/Changed HEP based on:   [] positioning   [] body mechanics   [] transfers   [] heat/ice application    [] other:      Other Objective/Functional Measures: MIKE hamstring tightness     Pain Level (0-10 scale) post treatment: 0    ASSESSMENT/Changes in Function: Pt with good tolerance to today's session. Pt reported extreme tightness during performance of long sit hamstring stretch. Pt with good tolerance to addition of L4 step up in order to improve quad strength. Pt struggled to perform sidelying hip adductor pull back and glute max. Pt is making good progress towards their goals.  Pt will continue to benefit from skilled therapeutic intervention at this time to address th remaining deficits as discussed in goals below. Patient will continue to benefit from skilled PT services to modify and progress therapeutic interventions, address functional mobility deficits, address ROM deficits, address strength deficits, analyze and address soft tissue restrictions, analyze and cue movement patterns, analyze and modify body mechanics/ergonomics and assess and modify postural abnormalities to attain remaining goals. [x]  See Plan of Care  []  See progress note/recertification  []  See Discharge Summary         Progress towards goals / Updated goals:  Short Term Goals: To be accomplished in 4 weeks:  1. Patient will be independent and compliant with HEP to progress toward goals and restore functional mobility. Eval Status: issued at eval  PN: 2/25/22: provided ADL handou  Last PN: Patient reports daily performance with his HEP.   3/2/22, met     2. Pt will have painfree right hip AROM WFL to aid in functional mechanics for ambulation/ADLs.   Eval Status:   Hip Left Right   Flexion 94 80   ER 20 16   IR 25 19      PN 2/28/22: progressing   Hip Left Right   Flexion 95 96   ER 24 23   IR 26 25   *Flexion measured in supine with knee flexion, ER/IR measured in sitting with towel under knee*   Last PN: 4/11/2252-azyudryquvk-chfk to be met in 4 more weeks.   Hip  PROM Left Right   Flexion 78 75   ER 25 30   IR 30 25   *Flexion measured in supine with knee in extension, ER/IR measured in sitting with towel under knee*       Current 5/5/22: progressing   Hip  PROM Left Right   Flexion 92 100   ER 25 (cramping) 28   IR 30 30   *Flexion measured in supine with knee in extension, ER/IR measured in sitting with towel under knee*           3. Patient will demonstrate 10x bridges to full height to indicate function glut and hamsting activation and strength.               Eval: Performed 5x bridges with decreased height and poor posterior chain activation             PN 2/28/22: pt completed 2x5 with TC and VC for body mechanics; pt reporters slight discomfort and fatigue  Current 4/18/22: pt completed 4x5 bridges with TC/VC for body mechanics; Moderate elevation and reports of fatigue post activity    Current 4/25/22: bridge 10x with moderate elevation and reports of muscle fatigue - progressing     Long Term Goals: To be accomplished in 8 weeks:  1. Patient will improve FOTO score by 11 points to improve functional tolerance for performing ADLs. Eval Status: FOTO 52  PN: 2/25/22: 59  Last PN: 4/11/22: performed on 3/23/22 58-[id number]-ypjg to be met in 4 more weeks  FOTO score = an established functional score where 100 = no disability   Current 4/25/22: 66 - MET     3.   Pt will have 5/5 bilat LE strength to return to goals of ambulation and ADLs.   Eval Status:                             Hip Left (1-5) Right (1-5)   Hip Flexion 4 4   Hip Extension NT NT   Hip ABD (seated) 4+ 4+   Hip ADD (seated) 4+ 4+   Hip ER 4 4   Hip IR 4- 4      Knee Left (1-5) Right (1-5)   Knee Flexion 4+ 4+   Knee Extension 5 5   Ankle PF 4+ 4+   Ankle DF 5 5   Other       Notes: Trunk compensation with MMT; poor hamstring and glut activation during PPT; cramping in left hamstrings during bridges     Last PN: 4/11/22 -progressing-goal to be met in 4 more weeks  Hip Left (1-5) Right (1-5)   Hip Flexion 4 4+   Hip Extension  Glut max 3-  3- 3-  3-   Hip ABD (sidelying with pelvic neutral, and no hip flexion compensation ) 3- 3-   Hip ADD (seated) 5 5   Hip ER 4+ 5-   Hip IR 4+ 4+          Current 4/25/22: progressing   Hip Left (1-5) Right (1-5)   Hip Flexion 4+ 4+   Hip Extension  Glut max 3+ 3+   Hip ABD (sidelying with pelvic neutral, and no hip flexion compensation ) 4 3+   Hip ADD (seated) 5 5   Hip ER 4+ 4+   Hip IR 4+ 4+            4.   Patient will improve pain in right hip to 1-2/10 at worst to improve activity tolerance and restore prior level of function. Eval Status: 10/10 at worst  PN 2/28/22: worst pain within the last week = 4-5/10   Last PN: 4/11/22 Pt reports worst pain in the last 48 hrs 4/10-progressing-goal to be met in 4 more weeks   Current 4/25/22: worst pain within the last week 1-2/10 - MET     New goals to address in the next 6 weeks:  1. The pt will complete a squat 10 with moderate (or greater) decent and proper body mechanics  to demo an increase in LE strength for stairs and community activities. PN2/28/22: Squat TRX chair taps x5, poor heel awareness, impaired ability to maintain PPT, right lateral lean, and muscle fatigue  Last PN: 4/11/22 Increased lumbar lordosis, decreased PPT, increased lumbar flexion, decreased knee flexion-goal to be met in 4 more weeks   Current 4/25/22:  squat 10 with moderate decent - VC for proper body mechanics, impaired ability to maintain PPT, muscle fatigue - progressing      2. The pt will improve LTR by 2 inches bilaterally to demo an incrase in lumbar mobility required for amb and IADLs. PN 2/28/22: LTR: right = 18.5 inches , left = 19 inches   Last PN 4/11/22 LTR:  LTR: right = 17 inches , left = 17.5 yajqsm-vrlvqiuxuyc-cslk to be met in 4 more weeks   Current 4/25/22: same as last assessed - progressing      New goal: 4/11/22- to be met in 4 more weeks   1) Pt will have 2 inch improvement in lumbar flexion so that pt can pick objects off the ground.   Last PN 4/11/22 Lumbar flexion:  11 inches fingertips to the ground, no reversal of lumbar lordosis   Current 5/3/22: 5 inches from the floor = lumbar flexion - progressing         PLAN  []  Upgrade activities as tolerated     [x]  Continue plan of care  []  Update interventions per flow sheet       []  Discharge due to:_  []  Other:_      Joanne Adams, PT 5/5/2022  7:47 AM    Future Appointments   Date Time Provider Estevan Raphael   5/5/2022  8:30 AM Jazmin Puente, [address] [facility]   5/11/2022  8:30 AM [facility]   5/13/2022 8:30 AM Sher Forde PT [facility]   5/17/2022  8:30 AM Sher Forde PT [facility]   5/19/2022  8:30 AM Sher Forde PT [facility]   5/23/2022  8:30 AM Sher Forde PT [facility]   5/25/2022  8:30 AM Sher Forde PT [facility]

## 2022-05-11 ENCOUNTER — HOSPITAL ENCOUNTER (OUTPATIENT)
Dept: PHYSICAL THERAPY | Age: 72
Discharge: HOME | End: 2022-05-11
Payer: MEDICARE

## 2022-05-11 PROCEDURE — 97112 NEUROMUSCULAR REEDUCATION: CPT

## 2022-05-11 PROCEDURE — 97110 THERAPEUTIC EXERCISES: CPT

## 2022-05-11 PROCEDURE — 97530 THERAPEUTIC ACTIVITIES: CPT

## 2022-05-11 NOTE — PROGRESS NOTES
PT DAILY TREATMENT NOTE    Patient Name: Marie Lomeli  Date:2022  : 1950  [x]  Patient  Verified  Payor: [place] MEDICARE / Plan: [place] MEDICARE PART A & B / Product Type: Medicare /    In time:8:30  Out time:9:16  Total Treatment Time (min): 46  Total Timed Codes (min): 46  1:1 Treatment Time (MC/BCBS only): 55   Visit #: 19 of 23    Treatment Dx: Right hip pain [M25.551]    SUBJECTIVE  Pain Level (0-10 scale): 0  Any medication changes, allergies to medications, adverse drug reactions, diagnosis change, or new procedure performed?: [x] No    [] Yes (see summary sheet for update)  Subjective functional status/changes:   [x] No changes reported    OBJECTIVE    20 min Therapeutic Exercise:  [x] See flow sheet :   Rationale: increase ROM and increase strength to improve the patients ability to return to PLOF. 14 min Therapeutic Activity:  [x]  See flow sheet :   Rationale: increase ROM, increase strength and improve coordination  to improve the patients ability to perform ADLs. 12 min Neuromuscular Re-education:  [x]  See flow sheet :   Rationale: increase ROM, increase strength, improve coordination and increase proprioception  to improve the patients ability to maintain proper spinal alignment during functional activites. With   [] TE   [] TA   [] neuro   [] other: Patient Education: [x] Review HEP    [] Progressed/Changed HEP based on:   [] positioning   [] body mechanics   [] transfers   [] heat/ice application    [] other:      Other Objective/Functional Measures: see goals     Pain Level (0-10 scale) post treatment: 0    ASSESSMENT/Changes in Function: Pt with good tolerance to today's session. Pt reported significant challenge during performance of 90/90 alternating kicks. Pt with good tolerance to addition of TRX squats in order to improve LE strength. Pt is making good progress towards their goals.  Pt will continue to benefit from skilled therapeutic intervention at this time to address th remaining deficits as discussed in goals below. Patient will continue to benefit from skilled PT services to modify and progress therapeutic interventions, address functional mobility deficits, address ROM deficits, address strength deficits, analyze and address soft tissue restrictions, analyze and cue movement patterns, analyze and modify body mechanics/ergonomics and assess and modify postural abnormalities to attain remaining goals. [x]  See Plan of Care  []  See progress note/recertification  []  See Discharge Summary         Progress towards goals / Updated goals:  Short Term Goals: To be accomplished in 4 weeks:  1. Patient will be independent and compliant with HEP to progress toward goals and restore functional mobility. Eval Status: issued at eval  PN: 2/25/22: provided ADL handou  Last PN: Patient reports daily performance with his HEP.   3/2/22, met     2. Pt will have painfree right hip AROM WFL to aid in functional mechanics for ambulation/ADLs. Eval Status:   Hip Left Right   Flexion 94 80   ER 20 16   IR 25 19     Last PN: 4/11/2241-rgzenxbyljy-zlxh to be met in 4 more weeks.   Hip  PROM Left Right   Flexion 78 75   ER 25 30   IR 30 25   *Flexion measured in supine with knee in extension, ER/IR measured in sitting with towel under knee*       Current 5/11/22: progressing   Hip  PROM Left Right   Flexion 92 100   ER 30 30   IR 35 32   *Flexion measured in supine with knee in extension, ER/IR measured in sitting with towel under knee*           3. Patient will demonstrate 10x bridges to full height to indicate function glut and hamsting activation and strength.               Eval: Performed 5x bridges with decreased height and poor posterior chain activation             PN 2/28/22: pt completed 2x5 with TC and VC for body mechanics; pt reporters slight discomfort and fatigue  Current 4/18/22: pt completed 4x5 bridges with TC/VC for body mechanics;  Moderate elevation and reports of fatigue post activity    Current 4/25/22: bridge 10x with moderate elevation and reports of muscle fatigue - progressing     Long Term Goals: To be accomplished in 8 weeks:  1. Patient will improve FOTO score by 11 points to improve functional tolerance for performing ADLs. Eval Status: FOTO 52  PN: 2/25/22: 59  Last PN: 4/11/22: performed on 3/23/22 90-yhhyifvlevu-lzeu to be met in 4 more weeks  FOTO score = an established functional score where 100 = no disability   Current 4/25/22: 66 - MET     3.   Pt will have 5/5 bilat LE strength to return to goals of ambulation and ADLs. Eval Status:                             Hip Left (1-5) Right (1-5)   Hip Flexion 4 4   Hip Extension NT NT   Hip ABD (seated) 4+ 4+   Hip ADD (seated) 4+ 4+   Hip ER 4 4   Hip IR 4- 4      Knee Left (1-5) Right (1-5)   Knee Flexion 4+ 4+   Knee Extension 5 5   Ankle PF 4+ 4+   Ankle DF 5 5   Other       Notes: Trunk compensation with MMT; poor hamstring and glut activation during PPT; cramping in left hamstrings during bridges     Last PN: 4/11/22 -progressing-goal to be met in 4 more weeks  Hip Left (1-5) Right (1-5)   Hip Flexion 4 4+   Hip Extension  Glut max 3-  3- 3-  3-   Hip ABD (sidelying with pelvic neutral, and no hip flexion compensation ) 3- 3-   Hip ADD (seated) 5 5   Hip ER 4+ 5-   Hip IR 4+ 4+       Current 4/25/22: progressing   Hip Left (1-5) Right (1-5)   Hip Flexion 4+ 4+   Hip Extension  Glut max 3+ 3+   Hip ABD (sidelying with pelvic neutral, and no hip flexion compensation ) 4 3+   Hip ADD (seated) 5 5   Hip ER 4+ 4+   Hip IR 4+ 4+      4.   Patient will improve pain in right hip to 1-2/10 at worst to improve activity tolerance and restore prior level of function.   Eval Status: 10/10 at worst  PN 2/28/22: worst pain within the last week = 4-5/10   Last PN: 4/11/22 Pt reports worst pain in the last 48 hrs 4/10-progressing-goal to be met in 4 more weeks   Current 4/25/22: worst pain within the last week 1-2/10 - MET     New goals to address in the next 6 weeks:  1. The pt will complete a squat 10 with moderate (or greater) decent and proper body mechanics  to demo an increase in LE strength for stairs and community activities. PN2/28/22: Squat TRX chair taps x5, poor heel awareness, impaired ability to maintain PPT, right lateral lean, and muscle fatigue  Last PN: 4/11/22 Increased lumbar lordosis, decreased PPT, increased lumbar flexion, decreased knee flexion-goal to be met in 4 more weeks   Current 4/25/22:  squat 10 with moderate decent - VC for proper body mechanics, impaired ability to maintain PPT, muscle fatigue - progressing      2. The pt will improve LTR by 2 inches bilaterally to demo an incrase in lumbar mobility required for amb and IADLs. PN 2/28/22: LTR: right = 18.5 inches , left = 19 inches   Last PN 4/11/22 LTR:  LTR: right = 17 inches , left = 17.5 fbudwf-gvuuslehygi-cmwn to be met in 4 more weeks   Current 5/11/22: Right 17 inch Left: 16.5 inches       New goal: 4/11/22- to be met in 4 more weeks   1) Pt will have 2 inch improvement in lumbar flexion so that pt can pick objects off the ground.   Last PN 4/11/22 Lumbar flexion:  11 inches fingertips to the ground, no reversal of lumbar lordosis   Current 5/3/22: 5 inches from the floor = lumbar flexion - progressing         PLAN  []  Upgrade activities as tolerated     [x]  Continue plan of care  []  Update interventions per flow sheet       []  Discharge due to:_  []  Other:_      Rolando Del Toro, PT 5/11/2022  8:25 AM    Future Appointments   Date Time Provider Estevan Raphael   5/11/2022  8:30 AM NATALIE Ellis [facility]   5/13/2022  8:30 AM NATALIE Kuo [facility]   5/17/2022  8:30 AM NATALIE Kuo [facility]   5/19/2022  8:30 AM NATALIE Kuo [facility]   5/23/2022  8:30 AM NATALIE Kuo [facility]   5/25/2022  8:30 AM NATALIE Ellis [facility]

## 2022-05-13 ENCOUNTER — HOSPITAL ENCOUNTER (OUTPATIENT)
Dept: PHYSICAL THERAPY | Age: 72
Discharge: HOME | End: 2022-05-13
Payer: MEDICARE

## 2022-05-13 PROCEDURE — 97530 THERAPEUTIC ACTIVITIES: CPT

## 2022-05-13 PROCEDURE — 97110 THERAPEUTIC EXERCISES: CPT

## 2022-05-13 PROCEDURE — 97112 NEUROMUSCULAR REEDUCATION: CPT

## 2022-05-13 NOTE — PROGRESS NOTES
PT DAILY TREATMENT NOTE    Patient Name: Keerthi Morton  HNBN:  : 1950  [x]  Patient  Verified  Payor: [place] MEDICARE / Plan: [place] MEDICARE PART A & B / Product Type: Medicare /    In time:8:33  Out time:9:21  Total Treatment Time (min): 48  Total Timed Codes (min): 48  1:1 Treatment Time (MC/BCBS only): 48   Visit #: 20 of 23    Treatment Dx: Right hip pain [M25.551]    SUBJECTIVE  Pain Level (0-10 scale): 0/10  Any medication changes, allergies to medications, adverse drug reactions, diagnosis change, or new procedure performed?: [x] No    [] Yes (see summary sheet for update)  Subjective functional status/changes:   [] No changes reported  Pt reports that he is feeling alright. May have to do some traveling due to illness in the family. OBJECTIVE          15 min Therapeutic Exercise:  [] See flow sheet :   Rationale: increase ROM, increase strength, improve coordination, improve balance and increase proprioception to improve the patients ability to perform daily activities with decreased pain and symptom levels      10 min Therapeutic Activity:  []  See flow sheet :   Rationale: increase ROM, increase strength, improve coordination, improve balance and increase proprioception  to improve the patients ability to perform daily activities with decreased pain and symptom levels       18 min Neuromuscular Re-education:  []  See flow sheet :   Rationale: increase ROM, increase strength, improve coordination, improve balance and increase proprioception  to improve the patients ability to perform daily activities with decreased pain and symptom levels      5  NC min Manual Therapy:  Pt in hook lying position: rib mobs with active breath, infraclavicular rib pumping with active breath, distal sternal mobs with active breath; consent for hand placement.       Rationale: decrease pain, increase ROM, increase tissue extensibility, decrease trigger points and increase postural awareness to improve the patients ability to perform daily activities with decreased pain and symptom levels    The manual therapy interventions were performed at a separate and distinct time from the therapeutic activities interventions. With   [x] TE   [x] TA   [x] neuro   [] other: Patient Education: [x] Review HEP    [] Progressed/Changed HEP based on:   [x] positioning   [x] body mechanics   [x] transfers   [] heat/ice application    [] other:      Other Objective/Functional Measures: Pt enters gym in no apparent distress. LTR: right:18.5/16  left: 17.5/17      Pain Level (0-10 scale) post treatment: 0    ASSESSMENT/Changes in Function: Patient tolerated therapy session well as there were no adverse reactions today. Pt noted to have rib flare, dec rib mobility and stomach breathing. Performed rib mobs and pt with improvements in LTR. Pt did get some cramping with performing 90-90 techniques. Pt is progressing with therapy as indicated by pt tolerating increase in exercise repetitions and resistance. Although showing progress patient would benefit from continuation of skilled physical therapy to address the remaining limitations. Patient will continue to benefit from skilled PT services to modify and progress therapeutic interventions, address functional mobility deficits, address ROM deficits, address strength deficits, analyze and address soft tissue restrictions, analyze and cue movement patterns, analyze and modify body mechanics/ergonomics, assess and modify postural abnormalities, address imbalance/dizziness and instruct in home and community integration to attain remaining goals. [x]  See Plan of Care  []  See progress note/recertification  []  See Discharge Summary         Progress towards goals / Updated goals:  Short Term Goals: To be accomplished in 4 weeks:  1. Patient will be independent and compliant with HEP to progress toward goals and restore functional mobility.    Eval Status: issued at eval  PN: 2/25/22: provided ADL handou  Last PN: Patient reports daily performance with his HEP.   3/2/22, met     2. Pt will have painfree right hip AROM WFL to aid in functional mechanics for ambulation/ADLs. Eval Status:   Hip Left Right   Flexion 94 80   ER 20 16   IR 25 19      Last PN: 4/11/2241-koohfwjsxom-bfsd to be met in 4 more weeks.   Hip  PROM Left Right   Flexion 78 75   ER 25 30   IR 30 25   *Flexion measured in supine with knee in extension, ER/IR measured in sitting with towel under knee*       Current: 5/13/22 not assessed           3. Patient will demonstrate 10x bridges to full height to indicate function glut and hamsting activation and strength.               Eval: Performed 5x bridges with decreased height and poor posterior chain activation   Last PN 4/25/22: bridge 10x with moderate elevation and reports of muscle fatigue - progressing  Current: 5/13/22 not assessed     Long Term Goals: To be accomplished in 8 weeks:  1. Patient will improve FOTO score by 11 points to improve functional tolerance for performing ADLs. Eval Status: FOTO 52  PN: 2/25/22: 59  Last PN: 4/11/22: performed on 3/23/22 04-lhuliupntta-jnnd to be met in 4 more weeks  FOTO score = an established functional score where 100 = no disability   Current 4/25/22: 66 - MET     3.   Pt will have 5/5 bilat LE strength to return to goals of ambulation and ADLs.   Eval Status:                             Hip Left (1-5) Right (1-5)   Hip Flexion 4 4   Hip Extension NT NT   Hip ABD (seated) 4+ 4+   Hip ADD (seated) 4+ 4+   Hip ER 4 4   Hip IR 4- 4      Knee Left (1-5) Right (1-5)   Knee Flexion 4+ 4+   Knee Extension 5 5   Ankle PF 4+ 4+   Ankle DF 5 5   Other       Notes: Trunk compensation with MMT; poor hamstring and glut activation during PPT; cramping in left hamstrings during bridge      Last PN 4/25/22: progressing   Hip Left (1-5) Right (1-5)   Hip Flexion 4+ 4+   Hip Extension  Glut max 3+ 3+   Hip ABD (sidelying with pelvic neutral, and no hip flexion compensation ) 4 3+   Hip ADD (seated) 5 5   Hip ER 4+ 4+   Hip IR 4+ 4+   Current: 5/13/22 pt fatigued with LE exercises       4.   Patient will improve pain in right hip to 1-2/10 at worst to improve activity tolerance and restore prior level of function. Eval Status: 10/10 at worst  PN 2/28/22: worst pain within the last week = 4-5/10   Last PN: 4/11/22 Pt reports worst pain in the last 48 hrs 4/10-progressing-goal to be met in 4 more weeks   Current 4/25/22: worst pain within the last week 1-2/10 - MET     New goals to address in the next 6 weeks:  1. The pt will complete a squat 10 with moderate (or greater) decent and proper body mechanics  to demo an increase in LE strength for stairs and community activities. Status at PN2/28/22: Squat TRX chair taps x5, poor heel awareness, impaired ability to maintain PPT, right lateral lean, and muscle fatigue  Last PN 4/25/22:  squat 10 with moderate decent - VC for proper body mechanics, impaired ability to maintain PPT, muscle fatigue - progressing   Current: 5/13/22 not formally assessed     2. The pt will improve LTR by 2 inches bilaterally to demo an incrase in lumbar mobility required for amb and IADLs. PN 2/28/22: LTR: right = 18.5 inches , left = 19 inches   Last PN 4/11/22 LTR:  LTR: right = 17 inches , left = 17.5 aygkjb-fbsdwdnnxpz-aose to be met in 4 more weeks  Current: 5/13/22 LTR: right:18.5/16  left: 17.5/17      New goal: 4/11/22- to be met in 4 more weeks   1) Pt will have 2 inch improvement in lumbar flexion so that pt can pick objects off the ground.   Last PN 4/11/22 Lumbar flexion:  11 inches fingertips to the ground, no reversal of lumbar lordosis   Current 5/13/22 not assessed         PLAN  []  Upgrade activities as tolerated     [x]  Continue plan of care  []  Update interventions per flow sheet       []  Discharge due to:_  []  Other:_      Reina Stone, PT, DPT, [facility] 5/13/2022  8:35 AM    Future Appointments Date Time Provider Estevan Raphael   5/17/2022  8:30 AM Christophe Rodarte, PT [facility]   5/19/2022  8:30 AM NATALIE KaurHPJOSHUA [facility]   5/23/2022  8:30 AM NATALIE KaurHPJOSHUA [facility]   5/25/2022  8:30 AM Amita Diaz PT [facility]

## 2022-05-17 ENCOUNTER — HOSPITAL ENCOUNTER (OUTPATIENT)
Dept: PHYSICAL THERAPY | Age: 72
Discharge: HOME | End: 2022-05-17
Payer: MEDICARE

## 2022-05-17 PROCEDURE — 97112 NEUROMUSCULAR REEDUCATION: CPT

## 2022-05-17 PROCEDURE — 97140 MANUAL THERAPY 1/> REGIONS: CPT

## 2022-05-17 PROCEDURE — 97110 THERAPEUTIC EXERCISES: CPT

## 2022-05-17 PROCEDURE — 97530 THERAPEUTIC ACTIVITIES: CPT

## 2022-05-17 NOTE — PROGRESS NOTES
PT DAILY TREATMENT NOTE    Patient Name: Conrad Cox  TUKI:[id number]  : 1950  [x]  Patient  Verified  Payor: [place] MEDICARE / Plan: [place] MEDICARE PART A & B / Product Type: Medicare /    In time:8:32  Out time:9:30  Total Treatment Time (min): 58  Total Timed Codes (min): 58  1:1 Treatment Time (MC/BCBS only): 62   Visit #: 21 of 23    Treatment Dx: Right hip pain [M25.551]    SUBJECTIVE  Pain Level (0-10 scale): 0  Any medication changes, allergies to medications, adverse drug reactions, diagnosis change, or new procedure performed?: [x] No    [] Yes (see summary sheet for update)  Subjective functional status/changes:   [] No changes reported  Pt reports that he is stiff all over. OBJECTIVE             15 min Therapeutic Exercise:  []? See flow sheet :   Rationale: increase ROM, increase strength, improve coordination, improve balance and increase proprioception to improve the patients ability to perform daily activities with decreased pain and symptom levels        10 min Therapeutic Activity:  []? See flow sheet :   Rationale: increase ROM, increase strength, improve coordination, improve balance and increase proprioception  to improve the patients ability to perform daily activities with decreased pain and symptom levels        25 min Neuromuscular Re-education:  []?   See flow sheet :   Rationale: increase ROM, increase strength, improve coordination, improve balance and increase proprioception  to improve the patients ability to perform daily activities with decreased pain and symptom levels        8 min Manual Therapy:  Pt in hook lying position: rib mobs with active breath, infraclavicular rib pumping with active breath, distal sternal mobs with active breath; consent for hand placement.       Rationale: decrease pain, increase ROM, increase tissue extensibility, decrease trigger points and increase postural awareness to improve the patients ability to perform daily activities with decreased pain and symptom levels     The manual therapy interventions were performed at a separate and distinct time from the therapeutic activities interventions.                                                                       With   [x]? TE   [x]? TA   [x]? neuro   []? other: Patient Education: [x]? Review HEP and updated per chart   []? Progressed/Changed HEP based on:   [x]? positioning   [x]? body mechanics   [x]? transfers   []? heat/ice application    []? other:       Other Objective/Functional Measures: Pt enters gym in no apparent distress. Pain Level (0-10 scale) post treatment: 0     ASSESSMENT/Changes in Function: Patient tolerated therapy session well as there were no adverse reactions today. Performed BOSU step cross and pt had some back discomfort. Pt noted to have dec trunk mobility with walking. Pt noted to breath into his stomach and not much in the apical ribs. Performed rib mobs and with tactile and verbal cuing, pt was breathing more in the proximal ribs, will need to continue to address. Pt did feel better after manual therapy. Pt is progressing with therapy as indicated by pt tolerating increase in exercise repetitions and resistance. Although showing progress patient would benefit from continuation of skilled physical therapy to address the remaining limitations.         Patient will continue to benefit from skilled PT services to modify and progress therapeutic interventions, address functional mobility deficits, address ROM deficits, address strength deficits, analyze and address soft tissue restrictions, analyze and cue movement patterns, analyze and modify body mechanics/ergonomics, assess and modify postural abnormalities, address imbalance/dizziness and instruct in home and community integration to attain remaining goals. [x]? See Plan of Care  []? See progress note/recertification  []?   See Discharge Summary         Progress towards goals / Updated goals:  Short Term Goals: To be accomplished in 4 weeks:  1. Patient will be independent and compliant with HEP to progress toward goals and restore functional mobility. Eval Status: issued at eval  Last PN: Patient reports daily performance with his HEP.   3/2/22, met  Current: 5/17/22 provided copies of previous HEP, and updated per chart     2. Pt will have painfree right hip AROM WFL to aid in functional mechanics for ambulation/ADLs. Eval Status:   Hip Left Right   Flexion 94 80   ER 20 16   IR 25 19      Last PN: 4/11/2296-tjqggqvkybg-ssfs to be met in 4 more weeks.   Hip  PROM Left Right   Flexion 78 75   ER 25 30   IR 30 25   *Flexion measured in supine with knee in extension, ER/IR measured in sitting with towel under knee*       Current: 5/17/22 not assessed           3. Patient will demonstrate 10x bridges to full height to indicate function glut and hamsting activation and strength.               Eval: Performed 5x bridges with decreased height and poor posterior chain activation   Last PN 4/25/22: bridge 10x with moderate elevation and reports of muscle fatigue - progressing  Current: 5/17/22 not assessed     Long Term Goals: To be accomplished in 8 weeks:  1. Patient will improve FOTO score by 11 points to improve functional tolerance for performing ADLs. Eval Status: FOTO 52  PN: 2/25/22: 59  Last PN: 4/11/22: performed on 3/23/22 19-ymnumuctmhw-ancj to be met in 4 more weeks  FOTO score = an established functional score where 100 = no disability   Current 4/25/22: 66 - MET     3.   Pt will have 5/5 bilat LE strength to return to goals of ambulation and ADLs.   Eval Status:                             Hip Left (1-5) Right (1-5)   Hip Flexion 4 4   Hip Extension NT NT   Hip ABD (seated) 4+ 4+   Hip ADD (seated) 4+ 4+   Hip ER 4 4   Hip IR 4- 4      Knee Left (1-5) Right (1-5)   Knee Flexion 4+ 4+   Knee Extension 5 5   Ankle PF 4+ 4+   Ankle DF 5 5   Other       Notes: Trunk compensation with MMT; poor hamstring and glut activation during PPT; cramping in left hamstrings during bridge      Last PN 4/25/22: progressing   Hip Left (1-5) Right (1-5)   Hip Flexion 4+ 4+   Hip Extension  Glut max 3+ 3+   Hip ABD (sidelying with pelvic neutral, and no hip flexion compensation ) 4 3+   Hip ADD (seated) 5 5   Hip ER 4+ 4+   Hip IR 4+ 4+   Current: 5/17/22 Pt fatigued with glut max         4.   Patient will improve pain in right hip to 1-2/10 at worst to improve activity tolerance and restore prior level of function. Eval Status: 10/10 at worst  PN 2/28/22: worst pain within the last week = 4-5/10   Last PN: 4/11/22 Pt reports worst pain in the last 48 hrs 4/10-progressing-goal to be met in 4 more weeks   Current 4/25/22: worst pain within the last week 1-2/10 - MET     New goals to address in the next 6 weeks:  1. The pt will complete a squat 10 with moderate (or greater) decent and proper body mechanics  to demo an increase in LE strength for stairs and community activities. Status at PN2/28/22: Squat TRX chair taps x5, poor heel awareness, impaired ability to maintain PPT, right lateral lean, and muscle fatigue  Last PN 4/25/22:  squat 10 with moderate decent - VC for proper body mechanics, impaired ability to maintain PPT, muscle fatigue - progressing   Current: 5/17/22 not formally assessed     2. The pt will improve LTR by 2 inches bilaterally to demo an incrase in lumbar mobility required for amb and IADLs. PN 2/28/22: LTR: right = 18.5 inches , left = 19 inches   Last PN 4/11/22 LTR:  LTR: right = 17 inches , left = 17.5 udbihx-yhxownnmkgg-ohoo to be met in 4 more weeks  Current: 5/17/22 LTR: right:18.5/16  left: 17.5/17      New goal: 4/11/22- to be met in 4 more weeks   1) Pt will have 2 inch improvement in lumbar flexion so that pt can pick objects off the ground.   Last PN 4/11/22 Lumbar flexion:  11 inches fingertips to the ground, no reversal of lumbar lordosis   Current 5/17/22 not assessed          PLAN  []  Upgrade activities as tolerated     [x]  Continue plan of care  []  Update interventions per flow sheet       []  Discharge due to:_  []  Other:_      Stanley Nissen, PT, DPT, [facility] 5/17/2022  8:23 AM    Future Appointments   Date Time Provider Estevan Raphael   5/17/2022  8:30 AM NATALIE Jones [facility]   5/19/2022  8:30 AM NATALIE Jones [facility]   5/23/2022  8:30 AM Mary Livingston, PT [facility]   5/25/2022  8:30 AM Ne Guerra, PT [facility]

## 2022-05-19 ENCOUNTER — APPOINTMENT (OUTPATIENT)
Dept: PHYSICAL THERAPY | Age: 72
End: 2022-05-19
Payer: MEDICARE

## 2022-05-23 ENCOUNTER — HOSPITAL ENCOUNTER (OUTPATIENT)
Dept: PHYSICAL THERAPY | Age: 72
Discharge: HOME | End: 2022-05-23
Payer: MEDICARE

## 2022-05-23 PROCEDURE — 97110 THERAPEUTIC EXERCISES: CPT

## 2022-05-23 PROCEDURE — 97112 NEUROMUSCULAR REEDUCATION: CPT

## 2022-05-23 PROCEDURE — 97530 THERAPEUTIC ACTIVITIES: CPT

## 2022-05-23 NOTE — PROGRESS NOTES
[facility] at the [address], [place] Est[place] rao, [address] [facility]  Phone: [phone]      Fax:  [phone]            Discharge Summary      Patient name: Chrissie Dangelo Start of Care: 2022   Referral source: Sheila Sanchez MD : 1950   Medical/Treatment Diagnosis: Right hip pain [M25.551] Onset Date:~2 months ago      Prior Hospitalization: see medical history Provider#: [id number]   Medications: Verified on Patient Summary List     Comorbidities: OA, Osteoporosis, Pre-diabetic, Depression, RA, Right RCR 2018, Left RCR , hx of left knee scope   Prior Level of Function: functionally independent, no AD, somewhat active lifestyle, enjoys working in his lawn      Visits from Start of Care: 22    Missed Visits: 4    Reporting Period : 22 to 22    Goals/Measure of Progress:  Short Term Goals: To be accomplished in 4 weeks:  1. Patient will be independent and compliant with HEP to progress toward goals and restore functional mobility. Eval Status: issued at eval  Last PN: Patient reports daily performance with his HEP.   3/2/22, met  Current: 22 reports he does some of them, needs to do more at home.      2. Pt will have painfree right hip AROM WFL to aid in functional mechanics for ambulation/ADLs.   Eval Status:   Hip Left Right   Flexion 94 80   ER 20 16   IR 25 19      Last PN: 2249-hbgcvyzmakg-kubl to be met in 4 more weeks.   Hip  PROM Left Right   Flexion 78 75   ER 25 30   IR 30 25   *Flexion measured in supine with knee in extension, ER/IR measured in sitting with towel under knee*       Current: 22 -goal not fully met, but progressed  Hip  PROM Left Right   Flexion 78 85   ER 22 32   IR 32 30             3. Patient will demonstrate 10x bridges to full height to indicate function glut and hamsting activation and strength.    Eval: Performed 5x bridges with decreased height and poor posterior chain activation   Last PN 22: bridge 10x with moderate elevation and reports of muscle fatigue - progressing  Current: 5/23/22 verbal cuing for PPT and not to hold breath, verbal cuing for proper breathing, pt able to clear lower back, no pain reported-progressed     Long Term Goals: To be accomplished in 8 weeks:  1. Patient will improve FOTO score by 11 points to improve functional tolerance for performing ADLs. Eval Status: FOTO 52  PN: 2/25/22: 59  Last PN: 4/11/22: performed on 3/23/22 13-parhrpgrlzl-zhvv to be met in 4 more weeks  FOTO score = an established functional score where 100 = no disability   Current 4/25/22: 66 - MET     3.   Pt will have 5/5 bilat LE strength to return to goals of ambulation and ADLs. Eval Status:                             Hip Left (1-5) Right (1-5)   Hip Flexion 4 4   Hip Extension NT NT   Hip ABD (seated) 4+ 4+   Hip ADD (seated) 4+ 4+   Hip ER 4 4   Hip IR 4- 4      Knee Left (1-5) Right (1-5)   Knee Flexion 4+ 4+   Knee Extension 5 5   Ankle PF 4+ 4+   Ankle DF 5 5   Other       Notes: Trunk compensation with MMT; poor hamstring and glut activation during PPT; cramping in left hamstrings during bridge      Last PN 4/25/22: progressing   Hip Left (1-5) Right (1-5)   Hip Flexion 4+ 4+   Hip Extension  Glut max 3+ 3+   Hip ABD (sidelying with pelvic neutral, and no hip flexion compensation ) 4 3+   Hip ADD (seated) 5 5   Hip ER 4+ 4+   Hip IR 4+ 4+   Current: 5/23/22-goal not met but progressed   Hip Left (1-5) Right (1-5)   Hip Flexion 5- 5-   Hip Extension  Glut max 3+ 3+   Hip ABD (sidelying with pelvic neutral, and no hip flexion compensation ) 4 3+   Hip ADD (seated) 5 5   Hip ER 4+ 4+   Hip IR 4+ 4+           4.   Patient will improve pain in right hip to 1-2/10 at worst to improve activity tolerance and restore prior level of function.   Eval Status: 10/10 at worst  PN 2/28/22: worst pain within the last week = 4-5/10   Last PN: 4/11/22 Pt reports worst pain in the last 48 hrs 4/10-progressing-goal to be met in 4 more weeks   Current 4/25/22: worst pain within the last week 1-2/10 - MET     New goals to address in the next 6 weeks:  1. The pt will complete a squat 10 with moderate (or greater) decent and proper body mechanics  to demo an increase in LE strength for stairs and community activities.   Status at PN2/28/22: Squat TRX chair taps x5, poor heel awareness, impaired ability to maintain PPT, right lateral lean, and muscle fatigue  Last PN 4/25/22:  squat 10 with moderate decent - VC for proper body mechanics, impaired ability to maintain PPT, muscle fatigue - progressing   Current: 5/23/22 Slight inc in lumbar lordosis, slight shift more to the right, moderate descent, verbal cuing to maintain PPT     2. The pt will improve LTR by 2 inches bilaterally to demo an incrase in lumbar mobility required for amb and IADLs. PN 2/28/22: LTR: right = 18.5 inches , left = 19 inches   Last PN 4/11/22 LTR:  LTR: right = 17 inches , left = 17.5 kruprg-yatlcfpthzd-uhfu to be met in 4 more weeks  Current: 5/23/22 Lower trunk rotation (inches): Right:16.5 left: 17-part'l MET      New goal: 4/11/22- to be met in 4 more weeks   1) Pt will have 2 inch improvement in lumbar flexion so that pt can pick objects off the ground. Last PN 4/11/22 Lumbar flexion:  11 inches fingertips to the ground, no reversal of lumbar lordosis   Current 5/23/22 Lumbar flexion: 9\" fingertips to the ground-goal MET      Assessment/ Summary of Care: Patient is a 69 yo male who presents to [facility] with c/o chronic right anterior hip and groin pain, with most recent exacerbation beginning 2 months prior to IE. This is patients 22nd treatment session including the IE on 2/2/22 and 6th visit since last PN/Recert. Pt reports that he feels 80% better since starting therapy. Pt reporting that he is able to get up out of the chair easier and is able to squat to  objects off the ground.  Pt has also progressed with certain LE ROM and certain KWAME MMT. Pt wishing to transition to HEP. Pt with HEP packet and appears to understand.  It is recommended that pt is d/c from skilled PT (per request),  continues with HEP and remains under care of MD.      RECOMMENDATIONS:  [x]Discontinue therapy: [x]Patient has reached or is progressing toward set goals, pt request      []Patient is non-compliant or has abdicated      []Due to lack of appreciable progress towards set goals    Gonzalez Fernández, PT, DPT, [facility] 5/23/2022 11:39 AM

## 2022-05-23 NOTE — PROGRESS NOTES
PT DAILY TREATMENT NOTE    Patient Name: Candida Braxton  Date:2022  : 1950  [x]  Patient  Verified  [place]or: [place] / Plan: [place] MED[place]ARE PART A & B / Product Type: Medicare /    In time:8:33  Out time:9:17  Total Treatment Time (min): 44  Total Timed Codes (min): 44  1:1 Treatment Time (MC/BCBS only): 40   Visit #: 22 of 23    Treatment Dx: Right hip pain [M25.551]    SUBJECTIVE  Pain Level (0-10 scale): 0/10  Any medication changes, allergies to medications, adverse drug reactions, diagnosis change, or new procedure performed?: [x] No    [] Yes (see summary sheet for update)  Subjective functional status/changes:   [] No changes reported  Pt reports that the worst pain in the last 48 hrs 2/10. Reports that he has been feeling pretty decent. Reports that when he gets up he has about 5 minutes of stiffness but then he gets up and move around he's fine. Reports that he did a few exercises. Reports that he can pick things off the ground is a slow process. Reports that he is getting up a little better out of the chair. Reports that he is able to chores around the house. Reports that he feels that he is 80% better since starting therapy.      OBJECTIVE        10 min Therapeutic Exercise:  [] See flow sheet :   Rationale: increase ROM, increase strength, improve coordination, improve balance and increase proprioception to improve the patients ability to perform daily activities with decreased pain and symptom levels      15 min Therapeutic Activity:  []  See flow sheet :   Rationale: increase ROM, increase strength, improve coordination, improve balance and increase proprioception  to improve the patients ability to perform daily activities with decreased pain and symptom levels       17 min Neuromuscular Re-education:  []  See flow sheet :   Rationale: increase ROM, increase strength, improve coordination, improve balance and increase proprioception  to improve the patients ability to perform daily activities with decreased pain and symptom levels      2  NC min Manual Therapy:  Rib mobs with active breath, consent for hand placement   Rationale: decrease pain, increase ROM, increase tissue extensibility, decrease trigger points and increase postural awareness to improve the patients ability to perform daily activities with decreased pain and symptom levels    The manual therapy interventions were performed at a separate and distinct time from the therapeutic activities interventions. With   [x] TE   [x] TA   [x] neuro   [] other: Patient Education: [x] Review HEP    [] Progressed/Changed HEP based on:   [x] positioning   [x] body mechanics   [x] transfers   [] heat/ice application    [] other:      Other Objective/Functional Measures: Pt enters gym in no apparent distress. Hip  PROM Left Right   Flexion 78 85   ER 22 32   IR 32 30     Hip Left (1-5) Right (1-5)   Hip Flexion 5- 5-   Hip Extension  Glut max 3+ 3+   Hip ABD (sidelying with pelvic neutral, and no hip flexion compensation ) 4 3+   Hip ADD (seated) 5 5   Hip ER 4+ 4+   Hip IR 4+ 4+       Bridge: verbal cuing for PPT and not to hold breath, verbal cuing for proper breathing, pt able to clear lower back, no pain reported    Squat: Slight inc in lumbar lordosis, slight shift more to the right, moderate descent    Lumbar flexion: 9\" fingertips to the ground     Pretest  Lower trunk rotation (inches): Right:16.5 left: 17    Pain Level (0-10 scale) post treatment: 0/10    ASSESSMENT/Changes in Function: Patient is a 69 yo male who presents to [facility] with c/o chronic right anterior hip and groin pain, with most recent exacerbation beginning 2 months prior to IE. This is patients 22nd treatment session including the IE on 2/2/22 and 6th visit since last PN/Recert. Pt reports that he feels 80% better since starting therapy.  Pt reporting that he is able to get up out of the chair easier and is able to squat to  objects off the ground. Pt has also progressed with certain LE ROM and certain LE MMT. Pt wishing to transition to HEP. Pt with HEP packet and appears to understand. It is recommended that pt is d/c from skilled PT (per request),  continues with HEP and remains under care of MD.         []  See Plan of Care  []  See progress note/recertification  [x]  See Discharge Summary         Progress towards goals / Updated goals:  Short Term Goals: To be accomplished in 4 weeks:  1. Patient will be independent and compliant with HEP to progress toward goals and restore functional mobility. Eval Status: issued at eval  Last PN: Patient reports daily performance with his HEP.   3/2/22, met  Current: 5/23/22 reports he does some of them, needs to do more at home.      2. Pt will have painfree right hip AROM WFL to aid in functional mechanics for ambulation/ADLs. Eval Status:   Hip Left Right   Flexion 94 80   ER 20 16   IR 25 19      Last PN: 4/11/2210-lrenjmtuvrz-feey to be met in 4 more weeks.   Hip  PROM Left Right   Flexion 78 75   ER 25 30   IR 30 25   *Flexion measured in supine with knee in extension, ER/IR measured in sitting with towel under knee*       Current: 5/23/22 -goal not fully met, but progressed  Hip  PROM Left Right   Flexion 78 85   ER 22 32   IR 32 30             3. Patient will demonstrate 10x bridges to full height to indicate function glut and hamsting activation and strength.    Eval: Performed 5x bridges with decreased height and poor posterior chain activation   Last PN 4/25/22: bridge 10x with moderate elevation and reports of muscle fatigue - progressing  Current: 5/23/22 verbal cuing for PPT and not to hold breath, verbal cuing for proper breathing, pt able to clear lower back, no pain reported-progressed     Long Term Goals: To be accomplished in 8 weeks:  1. Patient will improve FOTO score by 11 points to improve functional tolerance for performing ADLs.   Eval Status: FOTO 52  PN: 2/25/22: 59  Last PN: 4/11/22: performed on 3/23/22 21-[id number]-igpb to be met in 4 more weeks  FOTO score = an established functional score where 100 = no disability   Current 4/25/22: 66 - MET     3.   Pt will have 5/5 bilat LE strength to return to goals of ambulation and ADLs. Eval Status:                             Hip Left (1-5) Right (1-5)   Hip Flexion 4 4   Hip Extension NT NT   Hip ABD (seated) 4+ 4+   Hip ADD (seated) 4+ 4+   Hip ER 4 4   Hip IR 4- 4      Knee Left (1-5) Right (1-5)   Knee Flexion 4+ 4+   Knee Extension 5 5   Ankle PF 4+ 4+   Ankle DF 5 5   Other       Notes: Trunk compensation with MMT; poor hamstring and glut activation during PPT; cramping in left hamstrings during bridge      Last PN 4/25/22: progressing   Hip Left (1-5) Right (1-5)   Hip Flexion 4+ 4+   Hip Extension  Glut max 3+ 3+   Hip ABD (sidelying with pelvic neutral, and no hip flexion compensation ) 4 3+   Hip ADD (seated) 5 5   Hip ER 4+ 4+   Hip IR 4+ 4+   Current: 5/23/22-goal not met but progressed   Hip Left (1-5) Right (1-5)   Hip Flexion 5- 5-   Hip Extension  Glut max 3+ 3+   Hip ABD (sidelying with pelvic neutral, and no hip flexion compensation ) 4 3+   Hip ADD (seated) 5 5   Hip ER 4+ 4+   Hip IR 4+ 4+           4.   Patient will improve pain in right hip to 1-2/10 at worst to improve activity tolerance and restore prior level of function.   Eval Status: 10/10 at worst  PN 2/28/22: worst pain within the last week = 4-5/10   Last PN: 4/11/22 Pt reports worst pain in the last 48 hrs 4/10-progressing-goal to be met in 4 more weeks   Current 4/25/22: worst pain within the last week 1-2/10 - MET     New goals to address in the next 6 weeks:  1. The pt will complete a squat 10 with moderate (or greater) decent and proper body mechanics  to demo an increase in LE strength for stairs and community activities.   Status at PN2/28/22: Squat TRX chair taps x5, poor heel awareness, impaired ability to maintain PPT, right lateral lean, and muscle fatigue  Last PN 4/25/22:  squat 10 with moderate decent - VC for proper body mechanics, impaired ability to maintain PPT, muscle fatigue - progressing   Current: 5/23/22 Slight inc in lumbar lordosis, slight shift more to the right, moderate descent, verbal cuing to maintain PPT     2. The pt will improve LTR by 2 inches bilaterally to demo an incrase in lumbar mobility required for amb and IADLs. PN 2/28/22: LTR: right = 18.5 inches , left = 19 inches   Last PN 4/11/22 LTR:  LTR: right = 17 inches , left = 17.5 ygwnxw-gupmpeqqtoo-sllm to be met in 4 more weeks  Current: 5/23/22 Lower trunk rotation (inches): Right:16.5 left: 17-part'l MET      New goal: 4/11/22- to be met in 4 more weeks   1) Pt will have 2 inch improvement in lumbar flexion so that pt can pick objects off the ground.   Last PN 4/11/22 Lumbar flexion:  11 inches fingertips to the ground, no reversal of lumbar lordosis   Current 5/23/22 Lumbar flexion: 9\" fingertips to the ground-goal MET        PLAN  []  Upgrade activities as tolerated     []  Continue plan of care  []  Update interventions per flow sheet       [x]  Discharge due to:_pt request  []  Other:_      Reina Stone, PT, DPT, CIMT 5/23/2022  8:26 AM    Future Appointments   Date Time Provider Estevan Raphael   5/23/2022  8:30 AM Delaney Soliz, PT [facility]   5/25/2022  8:30 AM Tahir Asif, PT [facility]

## 2022-05-25 ENCOUNTER — HOSPITAL ENCOUNTER (OUTPATIENT)
Dept: PHYSICAL THERAPY | Age: 72
End: 2022-05-25
Payer: MEDICARE

## 2022-08-24 ENCOUNTER — TRANSCRIBE ORDER (OUTPATIENT)
Dept: SCHEDULING | Age: 72
End: 2022-08-24

## 2022-08-24 DIAGNOSIS — M25.562 KNEE PAIN, LEFT: Primary | ICD-10-CM

## 2023-04-06 PROBLEM — C61 MALIGNANT NEOPLASM OF PROSTATE (HCC): Status: ACTIVE | Noted: 2023-04-06

## (undated) DEVICE — KENDALL RADIOLUCENT FOAM MONITORING ELECTRODE RECTANGULAR SHAPE: Brand: KENDALL

## (undated) DEVICE — TRNQT TEXT 1X18IN BLU LF DISP -- CONVERT TO ITEM 362165

## (undated) DEVICE — 3M™ IOBAN™ 2 ANTIMICROBIAL INCISE DRAPE 6650EZ: Brand: IOBAN™ 2

## (undated) DEVICE — SOLUTION IV 500ML 0.9% SOD CHL FLX CONT

## (undated) DEVICE — SYR 50ML LR LCK 1ML GRAD NSAF --

## (undated) DEVICE — SPONGE GZ W4XL4IN COT 12 PLY TYP VII WVN C FLD DSGN

## (undated) DEVICE — SYRINGE 50ML E/T

## (undated) DEVICE — TRAP SPEC COLL POLYP POLYSTYR --

## (undated) DEVICE — DRAPE,SHOULDER,BEACH CHAIR,STERILE: Brand: MEDLINE

## (undated) DEVICE — ENDO CARRY-ON PROCEDURE KIT INCLUDES ENZYMATIC SPONGE, GAUZE, BIOHAZARD LABEL, TRAY, LUBRICANT, DIRTY SCOPE LABEL, WATER LABEL, TRAY, DRAWSTRING PAD, AND DEFENDO 4-PIECE KIT.: Brand: ENDO CARRY-ON PROCEDURE KIT

## (undated) DEVICE — KIT PT CARE SCHLEIN ULT SHLDR POS

## (undated) DEVICE — NDL FLTR TIP 5 MIC 18GX1.5IN --

## (undated) DEVICE — MAJ-1414 SINGLE USE ADPATER BIOPSY VALV: Brand: SINGLE USE ADAPTOR BIOPSY VALVE

## (undated) DEVICE — SYR 5ML 1/5 GRAD LL NSAF LF --

## (undated) DEVICE — Device

## (undated) DEVICE — NDL PRT INJ NSAF BLNT 18GX1.5 --

## (undated) DEVICE — CATH SUC CTRL PRT TRIFLO 14FR --

## (undated) DEVICE — SYR 3ML LL TIP 1/10ML GRAD --

## (undated) DEVICE — STRAP,POSITIONING,KNEE/BODY,FOAM,4X60": Brand: MEDLINE

## (undated) DEVICE — WRISTBAND ID AD W2.5XL9.5CM RED VYN ADH CLSR UNI-PRINT

## (undated) DEVICE — SINGLE PORT MANIFOLD: Brand: NEPTUNE 2

## (undated) DEVICE — MEDI-VAC NON-CONDUCTIVE SUCTION TUBING: Brand: CARDINAL HEALTH

## (undated) DEVICE — ELECTRODE RF DIA4MM 90DEG SUCT W/ INTEGR HNDPC VAPR S90

## (undated) DEVICE — SPONGE GZ W4XL4IN RAYON POLY 4 PLY NONWOVEN FASTER WICKING

## (undated) DEVICE — (D)PREP SKN CHLRAPRP APPL 26ML -- CONVERT TO ITEM 371833

## (undated) DEVICE — CANNULA CUSH AD W/ 14FT TBG

## (undated) DEVICE — GOWN,SIRUS,NONRNF,SETINSLV,2XL,18/CS: Brand: MEDLINE

## (undated) DEVICE — SOLUTION IRRIG 3000ML LAC R FLX CONT

## (undated) DEVICE — SET ADMIN 16ML TBNG L100IN 2 Y INJ SITE IV PIGGY BK DISP

## (undated) DEVICE — KNEE ARTHROSCOPY III-LF: Brand: MEDLINE INDUSTRIES, INC.

## (undated) DEVICE — TUBE IRRIG L8IN LNG PT W/ CONN FOR PMP SYS REDEUCE

## (undated) DEVICE — GARMENT,MEDLINE,DVT,INT,CALF,MED, GEN2: Brand: MEDLINE

## (undated) DEVICE — CATH IV SAFE STR 22GX1IN BLU -- PROTECTIV PLUS

## (undated) DEVICE — DYONICS 5.5 FULL RADIUS BONECUTTER                                    BLADES, ORANGE, 8000 MAXIMUM RPM,                                    PACKAGED 6 PER BOX, STERILE